# Patient Record
Sex: MALE | Race: BLACK OR AFRICAN AMERICAN | NOT HISPANIC OR LATINO | URBAN - METROPOLITAN AREA
[De-identification: names, ages, dates, MRNs, and addresses within clinical notes are randomized per-mention and may not be internally consistent; named-entity substitution may affect disease eponyms.]

---

## 2018-12-01 ENCOUNTER — EMERGENCY (EMERGENCY)
Facility: HOSPITAL | Age: 63
LOS: 0 days | Discharge: HOME | End: 2018-12-01
Attending: EMERGENCY MEDICINE
Payer: MEDICAID

## 2018-12-01 VITALS
RESPIRATION RATE: 17 BRPM | HEART RATE: 75 BPM | DIASTOLIC BLOOD PRESSURE: 76 MMHG | SYSTOLIC BLOOD PRESSURE: 143 MMHG | OXYGEN SATURATION: 98 %

## 2018-12-01 VITALS
SYSTOLIC BLOOD PRESSURE: 163 MMHG | DIASTOLIC BLOOD PRESSURE: 92 MMHG | RESPIRATION RATE: 16 BRPM | HEIGHT: 68 IN | HEART RATE: 70 BPM | TEMPERATURE: 98 F | WEIGHT: 169.98 LBS

## 2018-12-01 DIAGNOSIS — J45.21 MILD INTERMITTENT ASTHMA WITH (ACUTE) EXACERBATION: ICD-10-CM

## 2018-12-01 DIAGNOSIS — I10 ESSENTIAL (PRIMARY) HYPERTENSION: ICD-10-CM

## 2018-12-01 DIAGNOSIS — E11.9 TYPE 2 DIABETES MELLITUS WITHOUT COMPLICATIONS: ICD-10-CM

## 2018-12-01 DIAGNOSIS — R06.2 WHEEZING: ICD-10-CM

## 2018-12-01 DIAGNOSIS — Z79.899 OTHER LONG TERM (CURRENT) DRUG THERAPY: ICD-10-CM

## 2018-12-01 DIAGNOSIS — Z79.4 LONG TERM (CURRENT) USE OF INSULIN: ICD-10-CM

## 2018-12-01 PROBLEM — Z00.00 ENCOUNTER FOR PREVENTIVE HEALTH EXAMINATION: Status: ACTIVE | Noted: 2018-12-01

## 2018-12-01 PROCEDURE — 99284 EMERGENCY DEPT VISIT MOD MDM: CPT

## 2018-12-01 RX ORDER — ALBUTEROL 90 UG/1
2 AEROSOL, METERED ORAL
Qty: 1 | Refills: 0 | OUTPATIENT
Start: 2018-12-01 | End: 2018-12-07

## 2018-12-01 RX ORDER — IPRATROPIUM/ALBUTEROL SULFATE 18-103MCG
3 AEROSOL WITH ADAPTER (GRAM) INHALATION
Qty: 0 | Refills: 0 | Status: COMPLETED | OUTPATIENT
Start: 2018-12-01 | End: 2018-12-01

## 2018-12-01 RX ADMIN — Medication 20 MILLIGRAM(S): at 16:07

## 2018-12-01 RX ADMIN — Medication 3 MILLILITER(S): at 15:40

## 2018-12-01 RX ADMIN — Medication 3 MILLILITER(S): at 16:00

## 2018-12-01 RX ADMIN — Medication 3 MILLILITER(S): at 16:08

## 2018-12-01 NOTE — ED PROVIDER NOTE - MEDICAL DECISION MAKING DETAILS
Ddx: Asthma exacerbation/ no cough to suggest PNA/ No chest pain to suggest acs  Plan: albuterol, prednisone, reassess

## 2018-12-01 NOTE — ED PROVIDER NOTE - OBJECTIVE STATEMENT
Pt is a 64 yo gentleman with a pmhx of HTN, IDDM2, Asthma who presents to the ED with asthma exacerbation. He was out this morning, and felt some wheezing, and ran out of albuterol. No chest pain, no respiratory distress, no cough, no pleuritic pain.

## 2018-12-20 ENCOUNTER — INPATIENT (INPATIENT)
Facility: HOSPITAL | Age: 63
LOS: 7 days | Discharge: HOME | End: 2018-12-28
Attending: INTERNAL MEDICINE | Admitting: INTERNAL MEDICINE
Payer: MEDICAID

## 2018-12-20 VITALS — HEIGHT: 70 IN | WEIGHT: 184.97 LBS

## 2018-12-20 DIAGNOSIS — J96.90 RESPIRATORY FAILURE, UNSPECIFIED, UNSPECIFIED WHETHER WITH HYPOXIA OR HYPERCAPNIA: ICD-10-CM

## 2018-12-20 DIAGNOSIS — Z95.0 PRESENCE OF CARDIAC PACEMAKER: Chronic | ICD-10-CM

## 2018-12-20 DIAGNOSIS — E11.9 TYPE 2 DIABETES MELLITUS WITHOUT COMPLICATIONS: ICD-10-CM

## 2018-12-20 LAB
ALBUMIN SERPL ELPH-MCNC: 3.4 G/DL — SIGNIFICANT CHANGE UP (ref 3.3–5)
ALP SERPL-CCNC: 52 U/L — SIGNIFICANT CHANGE UP (ref 40–120)
ALT FLD-CCNC: 31 U/L — SIGNIFICANT CHANGE UP (ref 12–78)
AMPHET UR-MCNC: NEGATIVE — SIGNIFICANT CHANGE UP
ANION GAP SERPL CALC-SCNC: 10 MMOL/L — SIGNIFICANT CHANGE UP (ref 5–17)
APAP SERPL-MCNC: <2 UG/ML — LOW (ref 10–30)
APPEARANCE UR: CLEAR — SIGNIFICANT CHANGE UP
APTT BLD: 24.9 SEC — LOW (ref 28.5–37)
AST SERPL-CCNC: 34 U/L — SIGNIFICANT CHANGE UP (ref 15–37)
BACTERIA # UR AUTO: ABNORMAL
BARBITURATES UR SCN-MCNC: NEGATIVE — SIGNIFICANT CHANGE UP
BASE EXCESS BLDA CALC-SCNC: -2.6 MMOL/L — LOW (ref -2–2)
BASE EXCESS BLDA CALC-SCNC: -3.2 MMOL/L — LOW (ref -2–2)
BASOPHILS # BLD AUTO: 0.04 K/UL — SIGNIFICANT CHANGE UP (ref 0–0.2)
BASOPHILS NFR BLD AUTO: 0.4 % — SIGNIFICANT CHANGE UP (ref 0–2)
BENZODIAZ UR-MCNC: POSITIVE — SIGNIFICANT CHANGE UP
BILIRUB SERPL-MCNC: 0.3 MG/DL — SIGNIFICANT CHANGE UP (ref 0.2–1.2)
BILIRUB UR-MCNC: NEGATIVE — SIGNIFICANT CHANGE UP
BLOOD GAS COMMENTS: SIGNIFICANT CHANGE UP
BLOOD GAS SOURCE: SIGNIFICANT CHANGE UP
BLOOD GAS SOURCE: SIGNIFICANT CHANGE UP
BUN SERPL-MCNC: 17 MG/DL — SIGNIFICANT CHANGE UP (ref 7–23)
CALCIUM SERPL-MCNC: 8 MG/DL — LOW (ref 8.5–10.1)
CHLORIDE SERPL-SCNC: 106 MMOL/L — SIGNIFICANT CHANGE UP (ref 96–108)
CK MB BLD-MCNC: 1.7 % — SIGNIFICANT CHANGE UP (ref 0–3.5)
CK MB CFR SERPL CALC: 5.4 NG/ML — HIGH (ref 0.5–3.6)
CK SERPL-CCNC: 314 U/L — HIGH (ref 26–308)
CK SERPL-CCNC: 325 U/L — HIGH (ref 26–308)
CO2 SERPL-SCNC: 25 MMOL/L — SIGNIFICANT CHANGE UP (ref 22–31)
COCAINE METAB.OTHER UR-MCNC: NEGATIVE — SIGNIFICANT CHANGE UP
COLOR SPEC: YELLOW — SIGNIFICANT CHANGE UP
CREAT SERPL-MCNC: 1.11 MG/DL — SIGNIFICANT CHANGE UP (ref 0.5–1.3)
DIFF PNL FLD: ABNORMAL
EOSINOPHIL # BLD AUTO: 0.66 K/UL — HIGH (ref 0–0.5)
EOSINOPHIL NFR BLD AUTO: 6.9 % — HIGH (ref 0–6)
EPI CELLS # UR: NEGATIVE — SIGNIFICANT CHANGE UP
ETHANOL SERPL-MCNC: <10 MG/DL — SIGNIFICANT CHANGE UP (ref 0–10)
GLUCOSE BLDC GLUCOMTR-MCNC: 256 MG/DL — HIGH (ref 70–99)
GLUCOSE SERPL-MCNC: 220 MG/DL — HIGH (ref 70–99)
GLUCOSE UR QL: 1000 MG/DL
HCO3 BLDA-SCNC: 23 MMOL/L — SIGNIFICANT CHANGE UP (ref 21–29)
HCO3 BLDA-SCNC: 24 MMOL/L — SIGNIFICANT CHANGE UP (ref 21–29)
HCT VFR BLD CALC: 33.4 % — LOW (ref 39–50)
HGB BLD-MCNC: 10.2 G/DL — LOW (ref 13–17)
HOROWITZ INDEX BLDA+IHG-RTO: 21 — SIGNIFICANT CHANGE UP
HOROWITZ INDEX BLDA+IHG-RTO: 40 — SIGNIFICANT CHANGE UP
IMM GRANULOCYTES NFR BLD AUTO: 0.3 % — SIGNIFICANT CHANGE UP (ref 0–1.5)
INR BLD: 1.13 RATIO — SIGNIFICANT CHANGE UP (ref 0.88–1.16)
KETONES UR-MCNC: NEGATIVE — SIGNIFICANT CHANGE UP
LACTATE SERPL-SCNC: 0.7 MMOL/L — SIGNIFICANT CHANGE UP (ref 0.7–2)
LACTATE SERPL-SCNC: 2.9 MMOL/L — HIGH (ref 0.7–2)
LEUKOCYTE ESTERASE UR-ACNC: NEGATIVE — SIGNIFICANT CHANGE UP
LYMPHOCYTES # BLD AUTO: 1.63 K/UL — SIGNIFICANT CHANGE UP (ref 1–3.3)
LYMPHOCYTES # BLD AUTO: 16.9 % — SIGNIFICANT CHANGE UP (ref 13–44)
MAGNESIUM SERPL-MCNC: 2.4 MG/DL — SIGNIFICANT CHANGE UP (ref 1.6–2.6)
MCHC RBC-ENTMCNC: 28.3 PG — SIGNIFICANT CHANGE UP (ref 27–34)
MCHC RBC-ENTMCNC: 30.5 GM/DL — LOW (ref 32–36)
MCV RBC AUTO: 92.8 FL — SIGNIFICANT CHANGE UP (ref 80–100)
METHADONE UR-MCNC: NEGATIVE — SIGNIFICANT CHANGE UP
MONOCYTES # BLD AUTO: 0.77 K/UL — SIGNIFICANT CHANGE UP (ref 0–0.9)
MONOCYTES NFR BLD AUTO: 8 % — SIGNIFICANT CHANGE UP (ref 2–14)
NEUTROPHILS # BLD AUTO: 6.5 K/UL — SIGNIFICANT CHANGE UP (ref 1.8–7.4)
NEUTROPHILS NFR BLD AUTO: 67.5 % — SIGNIFICANT CHANGE UP (ref 43–77)
NITRITE UR-MCNC: NEGATIVE — SIGNIFICANT CHANGE UP
NRBC # BLD: 0 /100 WBCS — SIGNIFICANT CHANGE UP (ref 0–0)
OPIATES UR-MCNC: NEGATIVE — SIGNIFICANT CHANGE UP
PCO2 BLDA: 46 MMHG — SIGNIFICANT CHANGE UP (ref 32–46)
PCO2 BLDA: 54 MMHG — HIGH (ref 32–46)
PCP SPEC-MCNC: SIGNIFICANT CHANGE UP
PCP UR-MCNC: NEGATIVE — SIGNIFICANT CHANGE UP
PH BLD: 7.26 — LOW (ref 7.35–7.45)
PH BLD: 7.32 — LOW (ref 7.35–7.45)
PH UR: 5 — SIGNIFICANT CHANGE UP (ref 5–8)
PLATELET # BLD AUTO: 146 K/UL — LOW (ref 150–400)
PO2 BLDA: 134 MMHG — HIGH (ref 74–108)
PO2 BLDA: 388 MMHG — HIGH (ref 74–108)
POTASSIUM SERPL-MCNC: 4.6 MMOL/L — SIGNIFICANT CHANGE UP (ref 3.5–5.3)
POTASSIUM SERPL-SCNC: 4.6 MMOL/L — SIGNIFICANT CHANGE UP (ref 3.5–5.3)
PROT SERPL-MCNC: 7.1 GM/DL — SIGNIFICANT CHANGE UP (ref 6–8.3)
PROT UR-MCNC: 100 MG/DL
PROTHROM AB SERPL-ACNC: 12.7 SEC — SIGNIFICANT CHANGE UP (ref 10–12.9)
RBC # BLD: 3.6 M/UL — LOW (ref 4.2–5.8)
RBC # FLD: 14.6 % — HIGH (ref 10.3–14.5)
RBC CASTS # UR COMP ASSIST: SIGNIFICANT CHANGE UP /HPF (ref 0–4)
SALICYLATES SERPL-MCNC: <1.7 MG/DL — LOW (ref 2.8–20)
SAO2 % BLDA: 100 % — HIGH (ref 92–96)
SAO2 % BLDA: 99 % — HIGH (ref 92–96)
SODIUM SERPL-SCNC: 141 MMOL/L — SIGNIFICANT CHANGE UP (ref 135–145)
SP GR SPEC: 1.02 — SIGNIFICANT CHANGE UP (ref 1.01–1.02)
THC UR QL: NEGATIVE — SIGNIFICANT CHANGE UP
TROPONIN I SERPL-MCNC: 0.11 NG/ML — HIGH (ref 0.01–0.04)
TROPONIN I SERPL-MCNC: 0.27 NG/ML — HIGH (ref 0.01–0.04)
UROBILINOGEN FLD QL: NEGATIVE MG/DL — SIGNIFICANT CHANGE UP
WBC # BLD: 9.63 K/UL — SIGNIFICANT CHANGE UP (ref 3.8–10.5)
WBC # FLD AUTO: 9.63 K/UL — SIGNIFICANT CHANGE UP (ref 3.8–10.5)
WBC UR QL: SIGNIFICANT CHANGE UP

## 2018-12-20 PROCEDURE — 93010 ELECTROCARDIOGRAM REPORT: CPT

## 2018-12-20 PROCEDURE — 71045 X-RAY EXAM CHEST 1 VIEW: CPT | Mod: 26

## 2018-12-20 PROCEDURE — 71275 CT ANGIOGRAPHY CHEST: CPT | Mod: 26

## 2018-12-20 PROCEDURE — 99291 CRITICAL CARE FIRST HOUR: CPT

## 2018-12-20 PROCEDURE — 70450 CT HEAD/BRAIN W/O DYE: CPT | Mod: 26

## 2018-12-20 PROCEDURE — 99292 CRITICAL CARE ADDL 30 MIN: CPT

## 2018-12-20 RX ORDER — DEXTROSE 50 % IN WATER 50 %
25 SYRINGE (ML) INTRAVENOUS ONCE
Qty: 0 | Refills: 0 | Status: DISCONTINUED | OUTPATIENT
Start: 2018-12-20 | End: 2018-12-28

## 2018-12-20 RX ORDER — SODIUM CHLORIDE 9 MG/ML
1000 INJECTION, SOLUTION INTRAVENOUS
Qty: 0 | Refills: 0 | Status: DISCONTINUED | OUTPATIENT
Start: 2018-12-20 | End: 2018-12-28

## 2018-12-20 RX ORDER — DEXTROSE 50 % IN WATER 50 %
12.5 SYRINGE (ML) INTRAVENOUS ONCE
Qty: 0 | Refills: 0 | Status: DISCONTINUED | OUTPATIENT
Start: 2018-12-20 | End: 2018-12-28

## 2018-12-20 RX ORDER — DEXTROSE 50 % IN WATER 50 %
15 SYRINGE (ML) INTRAVENOUS ONCE
Qty: 0 | Refills: 0 | Status: DISCONTINUED | OUTPATIENT
Start: 2018-12-20 | End: 2018-12-28

## 2018-12-20 RX ORDER — PROPOFOL 10 MG/ML
5 INJECTION, EMULSION INTRAVENOUS
Qty: 1000 | Refills: 0 | Status: DISCONTINUED | OUTPATIENT
Start: 2018-12-20 | End: 2018-12-21

## 2018-12-20 RX ORDER — IPRATROPIUM/ALBUTEROL SULFATE 18-103MCG
3 AEROSOL WITH ADAPTER (GRAM) INHALATION ONCE
Qty: 0 | Refills: 0 | Status: COMPLETED | OUTPATIENT
Start: 2018-12-20 | End: 2018-12-20

## 2018-12-20 RX ORDER — GLUCAGON INJECTION, SOLUTION 0.5 MG/.1ML
1 INJECTION, SOLUTION SUBCUTANEOUS ONCE
Qty: 0 | Refills: 0 | Status: DISCONTINUED | OUTPATIENT
Start: 2018-12-20 | End: 2018-12-28

## 2018-12-20 RX ORDER — TIOTROPIUM BROMIDE 18 UG/1
1 CAPSULE ORAL; RESPIRATORY (INHALATION) DAILY
Qty: 0 | Refills: 0 | Status: COMPLETED | OUTPATIENT
Start: 2018-12-20 | End: 2019-11-18

## 2018-12-20 RX ORDER — SODIUM CHLORIDE 9 MG/ML
2000 INJECTION INTRAMUSCULAR; INTRAVENOUS; SUBCUTANEOUS ONCE
Qty: 0 | Refills: 0 | Status: COMPLETED | OUTPATIENT
Start: 2018-12-20 | End: 2018-12-20

## 2018-12-20 RX ORDER — INFLUENZA VIRUS VACCINE 15; 15; 15; 15 UG/.5ML; UG/.5ML; UG/.5ML; UG/.5ML
0.5 SUSPENSION INTRAMUSCULAR ONCE
Qty: 0 | Refills: 0 | Status: DISCONTINUED | OUTPATIENT
Start: 2018-12-20 | End: 2018-12-28

## 2018-12-20 RX ORDER — INSULIN LISPRO 100/ML
VIAL (ML) SUBCUTANEOUS EVERY 6 HOURS
Qty: 0 | Refills: 0 | Status: DISCONTINUED | OUTPATIENT
Start: 2018-12-20 | End: 2018-12-21

## 2018-12-20 RX ORDER — CHLORHEXIDINE GLUCONATE 213 G/1000ML
1 SOLUTION TOPICAL
Qty: 0 | Refills: 0 | Status: DISCONTINUED | OUTPATIENT
Start: 2018-12-20 | End: 2018-12-28

## 2018-12-20 RX ORDER — PANTOPRAZOLE SODIUM 20 MG/1
40 TABLET, DELAYED RELEASE ORAL ONCE
Qty: 0 | Refills: 0 | Status: COMPLETED | OUTPATIENT
Start: 2018-12-20 | End: 2018-12-20

## 2018-12-20 RX ORDER — FUROSEMIDE 40 MG
40 TABLET ORAL ONCE
Qty: 0 | Refills: 0 | Status: COMPLETED | OUTPATIENT
Start: 2018-12-20 | End: 2018-12-20

## 2018-12-20 RX ORDER — CHLORHEXIDINE GLUCONATE 213 G/1000ML
15 SOLUTION TOPICAL
Qty: 0 | Refills: 0 | Status: DISCONTINUED | OUTPATIENT
Start: 2018-12-20 | End: 2018-12-21

## 2018-12-20 RX ORDER — IPRATROPIUM/ALBUTEROL SULFATE 18-103MCG
3 AEROSOL WITH ADAPTER (GRAM) INHALATION EVERY 6 HOURS
Qty: 0 | Refills: 0 | Status: DISCONTINUED | OUTPATIENT
Start: 2018-12-20 | End: 2018-12-22

## 2018-12-20 RX ORDER — ALBUTEROL 90 UG/1
1 AEROSOL, METERED ORAL EVERY 4 HOURS
Qty: 0 | Refills: 0 | Status: COMPLETED | OUTPATIENT
Start: 2018-12-20 | End: 2019-11-18

## 2018-12-20 RX ORDER — ENOXAPARIN SODIUM 100 MG/ML
40 INJECTION SUBCUTANEOUS EVERY 24 HOURS
Qty: 0 | Refills: 0 | Status: DISCONTINUED | OUTPATIENT
Start: 2018-12-20 | End: 2018-12-21

## 2018-12-20 RX ORDER — PANTOPRAZOLE SODIUM 20 MG/1
40 TABLET, DELAYED RELEASE ORAL DAILY
Qty: 0 | Refills: 0 | Status: DISCONTINUED | OUTPATIENT
Start: 2018-12-20 | End: 2018-12-22

## 2018-12-20 RX ADMIN — Medication 6: at 20:45

## 2018-12-20 RX ADMIN — CHLORHEXIDINE GLUCONATE 15 MILLILITER(S): 213 SOLUTION TOPICAL at 20:42

## 2018-12-20 RX ADMIN — PANTOPRAZOLE SODIUM 40 MILLIGRAM(S): 20 TABLET, DELAYED RELEASE ORAL at 20:42

## 2018-12-20 RX ADMIN — Medication 3 MILLILITER(S): at 14:29

## 2018-12-20 RX ADMIN — Medication 40 MILLIGRAM(S): at 18:46

## 2018-12-20 RX ADMIN — PANTOPRAZOLE SODIUM 40 MILLIGRAM(S): 20 TABLET, DELAYED RELEASE ORAL at 15:47

## 2018-12-20 RX ADMIN — SODIUM CHLORIDE 2000 MILLILITER(S): 9 INJECTION INTRAMUSCULAR; INTRAVENOUS; SUBCUTANEOUS at 14:30

## 2018-12-20 RX ADMIN — CHLORHEXIDINE GLUCONATE 1 APPLICATION(S): 213 SOLUTION TOPICAL at 20:43

## 2018-12-20 RX ADMIN — SODIUM CHLORIDE 2000 MILLILITER(S): 9 INJECTION INTRAMUSCULAR; INTRAVENOUS; SUBCUTANEOUS at 15:47

## 2018-12-20 RX ADMIN — Medication 40 MILLIGRAM(S): at 20:43

## 2018-12-20 RX ADMIN — ENOXAPARIN SODIUM 40 MILLIGRAM(S): 100 INJECTION SUBCUTANEOUS at 20:42

## 2018-12-20 RX ADMIN — PROPOFOL 2.52 MICROGRAM(S)/KG/MIN: 10 INJECTION, EMULSION INTRAVENOUS at 14:50

## 2018-12-20 NOTE — ED ADULT NURSE NOTE - NS_BELOGIGINS_SECURE_ED_ALL_FT
money with security money with security. Clothing given to family see belonging sheet. Sneakers with pt

## 2018-12-20 NOTE — ED PROVIDER NOTE - PROGRESS NOTE DETAILS
Dr. Whitfield icu attending is here eval pt sts call her back after ct of head ct angio chest. Pt's wife is here at bed side now sts pt had asthma attack one week ago Pt also has a hx of mi with a pace maker. Dr. Weinberg icu attending is here eval and admit pt to Dr. Whitfield.

## 2018-12-20 NOTE — ED PROVIDER NOTE - ENMT, MLM
Airway patent, Nasal mucosa clear. Mouth with normal mucosa. Throat has no vesicles, no oropharyngeal exudates and uvula is midline. 7.5 et tube thru the vocal cord viualized by glyide scop. at 22 cm

## 2018-12-20 NOTE — ED ADULT NURSE REASSESSMENT NOTE - NS ED NURSE REASSESS COMMENT FT1
Wheezing decreased remains on ventilator tidal volume 450, FIO2 100%, PEEP 5, and rate 14 maintain Propofol at 10/ml hour Dr Platt that it was titrate up to sedate pt

## 2018-12-20 NOTE — H&P ADULT - HISTORY OF PRESENT ILLNESS
63 years old male by ems s/p et intubated c/o respiratory arrest. EMS sts pt was in a restaurant c/o sob and ems noted pt has a neb pump next to him pt was sob, unable to talk. Pt here is et intubated and breath sounds + expiratory wheezing bilaterally. Pt's wife is here at bed side now states pt had asthma attack one week ago was discharge from hospital with steroids  Recent history of PPM/AICD implantation at [facility] as per family. Patient seen and evaluated at bedside with ICU attending. CT head negative for acute infarct or bleed. CTA done  No CT evidence of pulmonary embolism. full report in chart.

## 2018-12-20 NOTE — ED ADULT NURSE REASSESSMENT NOTE - NS ED NURSE REASSESS COMMENT FT1
Pt remains on Profadol infusion current rate is 10 Microgram/KG/min Dr Platt is aware. He remain on vent setting Fio2 100%, PEEP of 5 Tidal volume 450 rate 14

## 2018-12-20 NOTE — ED PROVIDER NOTE - OBJECTIVE STATEMENT
60 years old male by ems s/p et intubated c/o respiratory arrest. EMS sts pt was in a restaurant c/o sob and ems noted pt has a neb pump next to him pt was sob, unable to talk. Pt here is et intubated and breath sounds + expiratory wheezing bilaterally.

## 2018-12-20 NOTE — ED ADULT TRIAGE NOTE - CHIEF COMPLAINT QUOTE
ems states, " pt was found in restaurant in respiratory distress, ams on floor , 74%on ra , 0.3 epi im ,   12mg Dexamethasone iv, 2gm mag sulfate, 24mg Etomidate, 10mg of valium iv in field , intibated with 7.5 ett , no info on patient , no id on him"

## 2018-12-20 NOTE — ED ADULT NURSE NOTE - OBJECTIVE STATEMENT
Pt to the ed via EMS  he is intubated size 7.5 lip line 22 cm Pt has IV access received meds Valium, Dexamethasone, Magnesium IV and Etomidate prior to intubation. Pt was at work and c/o SOB to co workers and collapse. Pt tried to used inhaler. EMS found it next to him. Pt wheezing and mucous to nose and when suctioned PRN on monitor sinus rhythm Dr Platt examined pt upon arrival. Pt with defibrillator to right chest

## 2018-12-20 NOTE — H&P ADULT - NSHPPHYSICALEXAM_GEN_ALL_CORE
GENERAL: NAD, well-groomed, well-developed,  HEAD:  Atraumatic, Normocephalic  EYES: EOMI, PERRLA, conjunctiva and sclera clear  ENMT: ETT in place to right lip,   NECK: Supple, No JVD, Normal thyroid  NERVOUS SYSTEM:  sedated on vent   CHEST/LUNG: Bilat air entry. No rales, ++rhonchi,  +wheezing, or  (-)rubs  HEART: Regular rate and rhythm; No murmurs, rubs, or gallops  ABDOMEN: Soft, Nontender, Nondistended; Bowel sounds present  EXTREMITIES:  2+ Peripheral Pulses, No clubbing, cyanosis, or edema  LYMPH: No lymphadenopathy noted  SKIN: No rashes or lesions

## 2018-12-20 NOTE — H&P ADULT - ASSESSMENT
64 y/o  male PMH Asthma, DM. CAD sp PPM/AICD, CVA,  admitted with respiratory acidosis requiring intubation unclear etiology, possible 2/2 asthma exacerbation.  CTA negative for PE or acute airspace disease.

## 2018-12-20 NOTE — ED PROVIDER NOTE - PMH
Asthma Asthma    CAD (coronary artery disease)    CVA (cerebral vascular accident)    Diabetes    HTN (hypertension)

## 2018-12-20 NOTE — ED PROVIDER NOTE - CRITICAL CARE PROVIDED
additional history taking/consultation with other physicians/interpretation of diagnostic studies/consult w/ pt's family directly relating to pts condition/direct patient care (not related to procedure)/documentation

## 2018-12-20 NOTE — H&P ADULT - PMH
Asthma    CAD (coronary artery disease)    CVA (cerebral vascular accident)    Diabetes    HTN (hypertension)

## 2018-12-20 NOTE — ED ADULT NURSE NOTE - NSIMPLEMENTINTERV_GEN_ALL_ED
Implemented All Fall Risk Interventions:  [place] to call system. Call bell, personal items and telephone within reach. Instruct patient to call for assistance. Room bathroom lighting operational. Non-slip footwear when patient is off stretcher. Physically safe environment: no spills, clutter or unnecessary equipment. Stretcher in lowest position, wheels locked, appropriate side rails in place. Provide visual cue, wrist band, yellow gown, etc. Monitor gait and stability. Monitor for mental status changes and reorient to person, place, and time. Review medications for side effects contributing to fall risk. Reinforce activity limits and safety measures with patient and family.

## 2018-12-20 NOTE — H&P ADULT - NSHPREVIEWOFSYSTEMS_GEN_ALL_CORE
due to clinical status as patient intubated unable to participate   as per family - stated denied recent chest pain + wheezing at home

## 2018-12-20 NOTE — H&P ADULT - PROBLEM SELECTOR PLAN 1
Admit MICU as d/w intensivist,   -mechanical ventilation, abg PRN,   -respiratory treatment, steroids,   -repeat labs and lactate  - will hold on ABX for now as wbc wnl   -follow up culture,   - Peridex, PPI and dvt prophylaxis,   -follow up xray,   -sedation - propofol, or  Precedex, or  fentanyl  or versed gtt,   -weaning daily

## 2018-12-21 LAB
-  COAGULASE NEGATIVE STAPHYLOCOCCUS: SIGNIFICANT CHANGE UP
A BAUMANNII DNA SPEC QL NAA+PROBE: SIGNIFICANT CHANGE UP
ANION GAP SERPL CALC-SCNC: 8 MMOL/L — SIGNIFICANT CHANGE UP (ref 5–17)
BASE EXCESS BLDA CALC-SCNC: 0.4 MMOL/L — SIGNIFICANT CHANGE UP (ref -2–2)
BLOOD GAS COMMENTS: SIGNIFICANT CHANGE UP
BLOOD GAS SOURCE: SIGNIFICANT CHANGE UP
BUN SERPL-MCNC: 17 MG/DL — SIGNIFICANT CHANGE UP (ref 7–23)
CALCIUM SERPL-MCNC: 7.7 MG/DL — LOW (ref 8.5–10.1)
CHLORIDE SERPL-SCNC: 110 MMOL/L — HIGH (ref 96–108)
CK SERPL-CCNC: 253 U/L — SIGNIFICANT CHANGE UP (ref 26–308)
CO2 SERPL-SCNC: 24 MMOL/L — SIGNIFICANT CHANGE UP (ref 22–31)
CREAT SERPL-MCNC: 0.92 MG/DL — SIGNIFICANT CHANGE UP (ref 0.5–1.3)
CULTURE RESULTS: NO GROWTH — SIGNIFICANT CHANGE UP
ENTEROCOC DNA BLD POS QL NAA+NON-PROBE: SIGNIFICANT CHANGE UP
GLUCOSE BLDC GLUCOMTR-MCNC: 200 MG/DL — HIGH (ref 70–99)
GLUCOSE BLDC GLUCOMTR-MCNC: 244 MG/DL — HIGH (ref 70–99)
GLUCOSE BLDC GLUCOMTR-MCNC: 267 MG/DL — HIGH (ref 70–99)
GLUCOSE BLDC GLUCOMTR-MCNC: 284 MG/DL — HIGH (ref 70–99)
GLUCOSE BLDC GLUCOMTR-MCNC: 303 MG/DL — HIGH (ref 70–99)
GLUCOSE SERPL-MCNC: 242 MG/DL — HIGH (ref 70–99)
GRAM STN FLD: SIGNIFICANT CHANGE UP
HBA1C BLD-MCNC: 10.1 % — HIGH (ref 4–5.6)
HCO3 BLDA-SCNC: 23 MMOL/L — SIGNIFICANT CHANGE UP (ref 21–29)
HCT VFR BLD CALC: 33.1 % — LOW (ref 39–50)
HGB BLD-MCNC: 10.2 G/DL — LOW (ref 13–17)
HOROWITZ INDEX BLDA+IHG-RTO: 30 — SIGNIFICANT CHANGE UP
MAGNESIUM SERPL-MCNC: 2.2 MG/DL — SIGNIFICANT CHANGE UP (ref 1.6–2.6)
MCHC RBC-ENTMCNC: 28 PG — SIGNIFICANT CHANGE UP (ref 27–34)
MCHC RBC-ENTMCNC: 30.8 GM/DL — LOW (ref 32–36)
MCV RBC AUTO: 90.9 FL — SIGNIFICANT CHANGE UP (ref 80–100)
METHOD TYPE: SIGNIFICANT CHANGE UP
METHOD TYPE: SIGNIFICANT CHANGE UP
NRBC # BLD: 0 /100 WBCS — SIGNIFICANT CHANGE UP (ref 0–0)
PCO2 BLDA: 31 MMHG — LOW (ref 32–46)
PH BLD: 7.48 — HIGH (ref 7.35–7.45)
PHOSPHATE SERPL-MCNC: 2 MG/DL — LOW (ref 2.5–4.5)
PLATELET # BLD AUTO: 142 K/UL — LOW (ref 150–400)
PO2 BLDA: 127 MMHG — HIGH (ref 74–108)
POTASSIUM SERPL-MCNC: 3.9 MMOL/L — SIGNIFICANT CHANGE UP (ref 3.5–5.3)
POTASSIUM SERPL-SCNC: 3.9 MMOL/L — SIGNIFICANT CHANGE UP (ref 3.5–5.3)
RBC # BLD: 3.64 M/UL — LOW (ref 4.2–5.8)
RBC # FLD: 14.6 % — HIGH (ref 10.3–14.5)
SAO2 % BLDA: 100 % — HIGH (ref 92–96)
SODIUM SERPL-SCNC: 142 MMOL/L — SIGNIFICANT CHANGE UP (ref 135–145)
SPECIMEN SOURCE: SIGNIFICANT CHANGE UP
SPECIMEN SOURCE: SIGNIFICANT CHANGE UP
TROPONIN I SERPL-MCNC: 0.29 NG/ML — HIGH (ref 0.01–0.04)
TROPONIN I SERPL-MCNC: 0.31 NG/ML — HIGH (ref 0.01–0.04)
WBC # BLD: 7.37 K/UL — SIGNIFICANT CHANGE UP (ref 3.8–10.5)
WBC # FLD AUTO: 7.37 K/UL — SIGNIFICANT CHANGE UP (ref 3.8–10.5)

## 2018-12-21 PROCEDURE — 93306 TTE W/DOPPLER COMPLETE: CPT | Mod: 26

## 2018-12-21 PROCEDURE — 99222 1ST HOSP IP/OBS MODERATE 55: CPT

## 2018-12-21 PROCEDURE — 71045 X-RAY EXAM CHEST 1 VIEW: CPT | Mod: 26

## 2018-12-21 PROCEDURE — 99223 1ST HOSP IP/OBS HIGH 75: CPT

## 2018-12-21 RX ORDER — ASPIRIN/CALCIUM CARB/MAGNESIUM 324 MG
81 TABLET ORAL DAILY
Qty: 0 | Refills: 0 | Status: DISCONTINUED | OUTPATIENT
Start: 2018-12-21 | End: 2018-12-28

## 2018-12-21 RX ORDER — PIPERACILLIN AND TAZOBACTAM 4; .5 G/20ML; G/20ML
3.38 INJECTION, POWDER, LYOPHILIZED, FOR SOLUTION INTRAVENOUS EVERY 8 HOURS
Qty: 0 | Refills: 0 | Status: DISCONTINUED | OUTPATIENT
Start: 2018-12-21 | End: 2018-12-22

## 2018-12-21 RX ORDER — POTASSIUM PHOSPHATE, MONOBASIC POTASSIUM PHOSPHATE, DIBASIC 236; 224 MG/ML; MG/ML
15 INJECTION, SOLUTION INTRAVENOUS ONCE
Qty: 0 | Refills: 0 | Status: COMPLETED | OUTPATIENT
Start: 2018-12-21 | End: 2018-12-21

## 2018-12-21 RX ORDER — FOLIC ACID 0.8 MG
1 TABLET ORAL DAILY
Qty: 0 | Refills: 0 | Status: DISCONTINUED | OUTPATIENT
Start: 2018-12-21 | End: 2018-12-28

## 2018-12-21 RX ORDER — INSULIN LISPRO 100/ML
4 VIAL (ML) SUBCUTANEOUS
Qty: 0 | Refills: 0 | Status: DISCONTINUED | OUTPATIENT
Start: 2018-12-21 | End: 2018-12-28

## 2018-12-21 RX ORDER — LISINOPRIL 2.5 MG/1
20 TABLET ORAL DAILY
Qty: 0 | Refills: 0 | Status: DISCONTINUED | OUTPATIENT
Start: 2018-12-21 | End: 2018-12-28

## 2018-12-21 RX ORDER — FUROSEMIDE 40 MG
20 TABLET ORAL DAILY
Qty: 0 | Refills: 0 | Status: DISCONTINUED | OUTPATIENT
Start: 2018-12-21 | End: 2018-12-28

## 2018-12-21 RX ORDER — ATORVASTATIN CALCIUM 80 MG/1
20 TABLET, FILM COATED ORAL AT BEDTIME
Qty: 0 | Refills: 0 | Status: DISCONTINUED | OUTPATIENT
Start: 2018-12-21 | End: 2018-12-28

## 2018-12-21 RX ORDER — PIPERACILLIN AND TAZOBACTAM 4; .5 G/20ML; G/20ML
3.38 INJECTION, POWDER, LYOPHILIZED, FOR SOLUTION INTRAVENOUS EVERY 12 HOURS
Qty: 0 | Refills: 0 | Status: DISCONTINUED | OUTPATIENT
Start: 2018-12-21 | End: 2018-12-21

## 2018-12-21 RX ORDER — ENOXAPARIN SODIUM 100 MG/ML
80 INJECTION SUBCUTANEOUS EVERY 12 HOURS
Qty: 0 | Refills: 0 | Status: DISCONTINUED | OUTPATIENT
Start: 2018-12-21 | End: 2018-12-28

## 2018-12-21 RX ORDER — CARVEDILOL PHOSPHATE 80 MG/1
12.5 CAPSULE, EXTENDED RELEASE ORAL EVERY 12 HOURS
Qty: 0 | Refills: 0 | Status: DISCONTINUED | OUTPATIENT
Start: 2018-12-21 | End: 2018-12-28

## 2018-12-21 RX ORDER — THIAMINE MONONITRATE (VIT B1) 100 MG
100 TABLET ORAL DAILY
Qty: 0 | Refills: 0 | Status: DISCONTINUED | OUTPATIENT
Start: 2018-12-21 | End: 2018-12-28

## 2018-12-21 RX ORDER — INSULIN GLARGINE 100 [IU]/ML
15 INJECTION, SOLUTION SUBCUTANEOUS AT BEDTIME
Qty: 0 | Refills: 0 | Status: DISCONTINUED | OUTPATIENT
Start: 2018-12-21 | End: 2018-12-28

## 2018-12-21 RX ORDER — INSULIN LISPRO 100/ML
VIAL (ML) SUBCUTANEOUS
Qty: 0 | Refills: 0 | Status: DISCONTINUED | OUTPATIENT
Start: 2018-12-21 | End: 2018-12-28

## 2018-12-21 RX ADMIN — CHLORHEXIDINE GLUCONATE 15 MILLILITER(S): 213 SOLUTION TOPICAL at 05:29

## 2018-12-21 RX ADMIN — Medication 6: at 16:30

## 2018-12-21 RX ADMIN — Medication 4 UNIT(S): at 16:30

## 2018-12-21 RX ADMIN — PIPERACILLIN AND TAZOBACTAM 25 GRAM(S): 4; .5 INJECTION, POWDER, LYOPHILIZED, FOR SOLUTION INTRAVENOUS at 21:40

## 2018-12-21 RX ADMIN — ATORVASTATIN CALCIUM 20 MILLIGRAM(S): 80 TABLET, FILM COATED ORAL at 21:38

## 2018-12-21 RX ADMIN — Medication 1 MILLIGRAM(S): at 15:28

## 2018-12-21 RX ADMIN — PANTOPRAZOLE SODIUM 40 MILLIGRAM(S): 20 TABLET, DELAYED RELEASE ORAL at 11:42

## 2018-12-21 RX ADMIN — Medication 6: at 00:50

## 2018-12-21 RX ADMIN — Medication 4 UNIT(S): at 11:41

## 2018-12-21 RX ADMIN — Medication 20 MILLIGRAM(S): at 15:28

## 2018-12-21 RX ADMIN — Medication 4: at 05:30

## 2018-12-21 RX ADMIN — LISINOPRIL 20 MILLIGRAM(S): 2.5 TABLET ORAL at 21:42

## 2018-12-21 RX ADMIN — Medication 3 MILLILITER(S): at 17:06

## 2018-12-21 RX ADMIN — Medication 40 MILLIGRAM(S): at 05:29

## 2018-12-21 RX ADMIN — POTASSIUM PHOSPHATE, MONOBASIC POTASSIUM PHOSPHATE, DIBASIC 63.75 MILLIMOLE(S): 236; 224 INJECTION, SOLUTION INTRAVENOUS at 09:11

## 2018-12-21 RX ADMIN — Medication 100 MILLIGRAM(S): at 15:28

## 2018-12-21 RX ADMIN — CARVEDILOL PHOSPHATE 12.5 MILLIGRAM(S): 80 CAPSULE, EXTENDED RELEASE ORAL at 17:22

## 2018-12-21 RX ADMIN — Medication 2: at 11:41

## 2018-12-21 RX ADMIN — Medication 3 MILLILITER(S): at 11:28

## 2018-12-21 RX ADMIN — ENOXAPARIN SODIUM 80 MILLIGRAM(S): 100 INJECTION SUBCUTANEOUS at 17:22

## 2018-12-21 RX ADMIN — Medication 3 MILLILITER(S): at 00:56

## 2018-12-21 RX ADMIN — Medication 3 MILLILITER(S): at 05:42

## 2018-12-21 RX ADMIN — Medication 8: at 21:40

## 2018-12-21 RX ADMIN — Medication 40 MILLIGRAM(S): at 17:22

## 2018-12-21 RX ADMIN — Medication 81 MILLIGRAM(S): at 15:28

## 2018-12-21 RX ADMIN — CHLORHEXIDINE GLUCONATE 1 APPLICATION(S): 213 SOLUTION TOPICAL at 05:29

## 2018-12-21 RX ADMIN — PROPOFOL 2.52 MICROGRAM(S)/KG/MIN: 10 INJECTION, EMULSION INTRAVENOUS at 00:41

## 2018-12-21 RX ADMIN — INSULIN GLARGINE 15 UNIT(S): 100 INJECTION, SOLUTION SUBCUTANEOUS at 21:39

## 2018-12-21 RX ADMIN — PIPERACILLIN AND TAZOBACTAM 25 GRAM(S): 4; .5 INJECTION, POWDER, LYOPHILIZED, FOR SOLUTION INTRAVENOUS at 13:32

## 2018-12-21 NOTE — PROGRESS NOTE ADULT - ASSESSMENT
64 y/o  male PMH Asthma? DM. CAD sp PPM/AICD, CVA,  admitted with respiratory distress requiring intubation in the field by EMS, unclear etiology, possible 2/2 asthma exacerbation.  CTA negative for PE or acute airspace disease. CT head with old infarcts.    -S/P extubation this am , doing well  -cont on due nebs, taper steroids  -Pulm fup on floors and outpt.  -BC sent from ER with GPCS and GNR. contamination?   -repeat blood c/s, empiric abx, no evidence of infection  -insulin sliding scale and fingersticks,   -resume home meds  -check A1C, diabetic teaching, ADA diet.       CC time: 40 min

## 2018-12-21 NOTE — PROVIDER CONTACT NOTE (CRITICAL VALUE NOTIFICATION) - TEST AND RESULT REPORTED:
Blood culture from 12/20@1520 Growth in aerobic bottle Gram positive cocci in pairs and chains and gram negative Rodes

## 2018-12-21 NOTE — DIETITIAN INITIAL EVALUATION ADULT. - NS AS NUTRI INTERV ED CONTENT
Recommended modifications/Purpose of the nutrition education/Priority modifications/Survival information/Provided diabetic diet instruction & meal planning with the plate method & outpatient diabetes wellness information & Vit K & medications handout material

## 2018-12-21 NOTE — PROVIDER CONTACT NOTE (CRITICAL VALUE NOTIFICATION) - TEST AND RESULT REPORTED:
blood c/s send 12/20/18 growth in aerobottle gram positive cocci in pairs and chains and gram negative rods

## 2018-12-21 NOTE — DIETITIAN INITIAL EVALUATION ADULT. - ADHERENCE
poor/Noncompliant diet; excessive intake of CHO & sugar; Breakfast; Egg & cheese & WW bread & coffee;   Lunch & Dinner; G'Sharmila, rice, Dumplings Sardines, jackson chicken Pt states he avoid dark green leafy vegetables due to Coumadin

## 2018-12-21 NOTE — DIETITIAN INITIAL EVALUATION ADULT. - PERTINENT MEDS FT
ALBUTerol    90 MICROgram(s) HFA Inhaler  ALBUTerol/ipratropium for Nebulization  chlorhexidine 4% Liquid  dextrose 40% Gel PRN  dextrose 5%.  dextrose 50% Injectable  dextrose 50% Injectable  dextrose 50% Injectable  enoxaparin Injectable  glucagon  Injectable PRN  influenza   Vaccine  insulin glargine Injectable (LANTUS) 15units HS  insulin lispro (HumaLOG) corrective regimen sliding scale  insulin lispro Injectable (HumaLOG)  insulin lispro Injectable (HumaLOG)  insulin lispro Injectable (HumaLOG)  methylPREDNISolone sodium succinate Injectable  pantoprazole  Injectable  piperacillin/tazobactam IVPB.  propofol Infusion.  tiotropium 18 MICROgram(s) Capsule

## 2018-12-21 NOTE — DIETITIAN INITIAL EVALUATION ADULT. - SOURCE
patient/family/significant other/other (specify)/Spoke to pt & daughter & ex- wife; Medical chart review

## 2018-12-21 NOTE — CONSULT NOTE ADULT - SUBJECTIVE AND OBJECTIVE BOX
CHIEF COMPLAINT:  Patient is a 63y old  Male who presents with a chief complaint of respiratory failure (21 Dec 2018 11:04)      HPI:  63 years old male by ems s/p et intubated c/o respiratory arrest. pt was in a restaurant c/o sob and ems noted pt has a neb pump next to him pt was sob, unable to talk. Pt here is et intubated and breath sounds + expiratory wheezing bilaterally. He had asthma attack one week ago was discharge from hospital with steroids  Recent history of PPM/AICD implantation at [facility] as per family. Patient seen and evaluated at bedside with ICU attending. CT head negative for acute infarct or bleed. CTA done  No CT evidence of pulmonary embolism.    ALLERGIES:  No Known Allergies    MEDICATIONS  (STANDING):  ALBUTerol    90 MICROgram(s) HFA Inhaler 1 Puff(s) Inhalation every 4 hours  ALBUTerol/ipratropium for Nebulization 3 milliLiter(s) Nebulizer every 6 hours  aspirin enteric coated 81 milliGRAM(s) Oral daily  atorvastatin 20 milliGRAM(s) Oral at bedtime  carvedilol 12.5 milliGRAM(s) Oral every 12 hours  chlorhexidine 4% Liquid 1 Application(s) Topical <User Schedule>  enoxaparin Injectable 80 milliGRAM(s) SubCutaneous every 12 hours  folic acid 1 milliGRAM(s) Oral daily  furosemide    Tablet 20 milliGRAM(s) Oral daily  influenza   Vaccine 0.5 milliLiter(s) IntraMuscular once  insulin glargine Injectable (LANTUS) 15 Unit(s) SubCutaneous at bedtime  insulin lispro (HumaLOG) corrective regimen sliding scale   SubCutaneous Before meals and at bedtime  insulin lispro Injectable (HumaLOG) 4 Unit(s) SubCutaneous before breakfast  insulin lispro Injectable (HumaLOG) 4 Unit(s) SubCutaneous before lunch  insulin lispro Injectable (HumaLOG) 4 Unit(s) SubCutaneous before dinner  lisinopril 20 milliGRAM(s) Oral daily  pantoprazole  Injectable 40 milliGRAM(s) IV Push daily  piperacillin/tazobactam IVPB. 3.375 Gram(s) IV Intermittent every 8 hours  thiamine 100 milliGRAM(s) Oral daily  tiotropium 18 MICROgram(s) Capsule 1 Capsule(s) Inhalation daily      PAST MEDICAL & SURGICAL HISTORY:  Diabetes  HTN (hypertension)  CAD (coronary artery disease)  CVA (cerebral vascular accident)  Asthma  Cardiac pacemaker      FAMILY HISTORY:  No pertinent family history in first degree relatives      SOCIAL HISTORY:  Denies tobacco use    REVIEW OF SYSTEMS:  General:  No wt loss, fevers, chills, night sweats  Eyes:  Good vision, no reported pain  ENT:  No sore throat, pain, runny nose, dysphagia  CV:  No pain, palpitations, hypo/hypertension  Resp:  No dyspnea, cough, tachypnea, wheezing  GI:  No pain, nausea, vomiting, diarrhea, constipation  :  No pain, bleeding, incontinence, nocturia  Muscle:  No pain, weakness  Breast:  No pain, abscess, mass, discharge  Neuro:  No weakness, tingling, memory problems  Psych:  No fatigue, insomnia, mood problems, depression  Endocrine:  No polyuria, polydipsia, cold/heat intolerance  Heme:  No petechiae, ecchymosis, easy bruisability  Skin:  No rash, edema    PHYSICAL EXAM:  Vital Signs:  Vital Signs Last 24 Hrs  T(C): 37.1 (21 Dec 2018 15:58), Max: 37.2 (21 Dec 2018 12:00)  T(F): 98.8 (21 Dec 2018 15:58), Max: 98.9 (21 Dec 2018 12:00)  HR: 86 (21 Dec 2018 17:24) (61 - 92)  BP: 131/62 (21 Dec 2018 16:00) (89/57 - 161/88)  BP(mean): 78 (21 Dec 2018 16:00) (65 - 106)  RR: 20 (21 Dec 2018 17:00) (14 - 24)  SpO2: 98% (21 Dec 2018 17:24) (97% - 100%)  I&O's Summary    20 Dec 2018 07:  -  21 Dec 2018 07:00  --------------------------------------------------------  IN: 4185.6 mL / OUT: 3300 mL / NET: 885.6 mL    21 Dec 2018 07:  -  21 Dec 2018 18:09  --------------------------------------------------------  IN: 750 mL / OUT: 460 mL / NET: 290 mL    Tele: SR  General:  Appears stated age, well-groomed, well-nourished, no distress  HEENT:  NC/AT, patent nares w/ pink mucosa, OP clear w/o lesions, PERRL, EOMI, conjunctivae clear, no thyromegaly, nodules, adenopathy, no JVD  Chest:  Full & symmetric excursion, no increased effort, breath sounds clear  Cardiovascular:  Regular rhythm, S1, S2, no murmur  Abdomen:  Soft, non-tender, non-distended, normoactive bowel sounds  Extremities:  no edema  Skin:  skin is warm/dry  Musculoskeletal:  Full ROM in all joints w/o swelling/tenderness/effusion    LABORATORY:                          10.2   7.37  )-----------( 142      ( 21 Dec 2018 04:02 )             33.1     12-    142  |  110<H>  |  17  ----------------------------<  242<H>  3.9   |  24  |  0.92    Ca    7.7<L>      21 Dec 2018 04:02  Phos  2.0     12  Mg     2.2         TPro  7.1  /  Alb  3.4  /  TBili  0.3  /  DBili  x   /  AST  34  /  ALT  31  /  AlkPhos  52  12-20    ABG - ( 21 Dec 2018 08:20 )  pH, Arterial: x     pH, Blood: 7.48  /  pCO2: 31    /  pO2: 127   / HCO3: 23    / Base Excess: 0.4   /  SaO2: 100         CARDIAC MARKERS ( 21 Dec 2018 12:07 )  .294 ng/mL / x     / x     / x     / x      CARDIAC MARKERS ( 21 Dec 2018 04:02 )  .309 ng/mL / x     / 253 U/L / x     / x      CARDIAC MARKERS ( 20 Dec 2018 19:45 )  .272 ng/mL / x     / 314 U/L / x     / x      CARDIAC MARKERS ( 20 Dec 2018 15:14 )  .112 ng/mL / x     / 325 U/L / x     / 5.4 ng/mL      CAPILLARY BLOOD GLUCOSE  POCT Blood Glucose.: 284 mg/dL (21 Dec 2018 16:26)  POCT Blood Glucose.: 200 mg/dL (21 Dec 2018 11:36)  POCT Blood Glucose.: 244 mg/dL (21 Dec 2018 05:28)  POCT Blood Glucose.: 267 mg/dL (21 Dec 2018 00:48)  POCT Blood Glucose.: 256 mg/dL (20 Dec 2018 20:39)    LIVER FUNCTIONS - ( 20 Dec 2018 15:14 )  Alb: 3.4 g/dL / Pro: 7.1 gm/dL / ALK PHOS: 52 U/L / ALT: 31 U/L / AST: 34 U/L / GGT: x           PT/INR - ( 20 Dec 2018 15:14 )   PT: 12.7 sec;   INR: 1.13 ratio         PTT - ( 20 Dec 2018 15:14 )  PTT:24.9 sec  Urinalysis Basic - ( 20 Dec 2018 15:59 )    Color: Yellow / Appearance: Clear / S.020 / pH: x  Gluc: x / Ketone: Negative  / Bili: Negative / Urobili: Negative mg/dL   Blood: x / Protein: 100 mg/dL / Nitrite: Negative   Leuk Esterase: Negative / RBC: 0-2 /HPF / WBC 0-2   Sq Epi: x / Non Sq Epi: Negative / Bacteria: Few    IMAGING:  ECG: SR, LVH    < from: Xray Chest 1 View AP/PA. (18 @ 07:36) >  No acute airspace disease or consolidation suggested. Support devices in   situ.    < end of copied text >    ASSESSMENT:  63 years old male by ems s/p et intubated c/o respiratory arrest. pt was in a restaurant c/o sob and ems noted pt has a neb pump next to him pt was sob, unable to talk. Pt here is et intubated and breath sounds + expiratory wheezing bilaterally. He had asthma attack one week ago was discharge from hospital with steroids  Recent history of PPM/AICD implantation at [facility] as per family. Patient seen and evaluated at bedside with ICU attending. CT head negative for acute infarct or bleed. CTA done  No CT evidence of pulmonary embolism.    PLAN:     CCU care management.  Follow up labs. Telemetry monitoring.    MEDICATIONS  (STANDING):  ALBUTerol    90 MICROgram(s) HFA Inhaler 1 Puff(s) Inhalation every 4 hours  ALBUTerol/ipratropium for Nebulization 3 milliLiter(s) Nebulizer every 6 hours  aspirin enteric coated 81 milliGRAM(s) Oral daily  atorvastatin 20 milliGRAM(s) Oral at bedtime  carvedilol 12.5 milliGRAM(s) Oral every 12 hours  chlorhexidine 4% Liquid 1 Application(s) Topical <User Schedule>  enoxaparin Injectable 80 milliGRAM(s) SubCutaneous every 12 hours  folic acid 1 milliGRAM(s) Oral daily  furosemide    Tablet 20 milliGRAM(s) Oral daily  influenza   Vaccine 0.5 milliLiter(s) IntraMuscular once  insulin glargine Injectable (LANTUS) 15 Unit(s) SubCutaneous at bedtime  insulin lispro (HumaLOG) corrective regimen sliding scale   SubCutaneous Before meals and at bedtime  insulin lispro Injectable (HumaLOG) 4 Unit(s) SubCutaneous before breakfast  insulin lispro Injectable (HumaLOG) 4 Unit(s) SubCutaneous before lunch  insulin lispro Injectable (HumaLOG) 4 Unit(s) SubCutaneous before dinner  lisinopril 20 milliGRAM(s) Oral daily  pantoprazole  Injectable 40 milliGRAM(s) IV Push daily  piperacillin/tazobactam IVPB. 3.375 Gram(s) IV Intermittent every 8 hours  thiamine 100 milliGRAM(s) Oral daily  tiotropium 18 MICROgram(s) Capsule 1 Capsule(s) Inhalation daily    echo pending.    Gutierrez Stovall MD, FACC, FASRAY, FASNC, FACP  Director, Heart Failure Services  [facility]  , Department of Cardiology  [facility]

## 2018-12-21 NOTE — DIETITIAN INITIAL EVALUATION ADULT. - PERTINENT LABORATORY DATA
12-21 Na 142   Glu 242   K+ 3.9   Cr 0.29   BUN 17   Phos 2.0   Alb 3.6   PAB n/a   Hgb 10.2   Hct 33.1   HgA1C 10.1 %<H>  Glucose, Serum: 242 mg/dL <H>, UOEY=336, 244, 267

## 2018-12-21 NOTE — PROGRESS NOTE ADULT - SUBJECTIVE AND OBJECTIVE BOX
HPI:  63 years old male by ems s/p et intubated c/o respiratory arrest. EMS sts pt was in a restaurant c/o sob and ems noted pt has a neb pump next to him pt was sob, unable to talk. Pt here is et intubated and breath sounds + expiratory wheezing bilaterally. Pt's wife is here at bed side now states pt had asthma attack one week ago was discharge from hospital with steroids  Recent history of PPM/AICD implantation at [facility] as per family. Patient seen and evaluated at bedside with ICU attending. CT head negative for acute infarct or bleed. CTA done  No CT evidence of pulmonary embolism. full report in chart. (20 Dec 2018 18:25)    Over 24 Hrs: s/p extubation this am, doing well.    PAST MEDICAL & SURGICAL HISTORY:  Diabetes  HTN (hypertension)  CAD (coronary artery disease)  CVA (cerebral vascular accident)  Asthma  Cardiac pacemaker      ## ROS:   CONSTITUTIONAL: No fever, chills  EYES: No eye pain, visual disturbances  ENMT:  No difficulty hearing, No sinus or throat pain  RESPIRATORY: No cough, wheezing, hemoptysis; No shortness of breath  CARDIOVASCULAR: No chest pain, palpitations  GASTROINTESTINAL: No abdominal or epigastric pain. No nausea, vomiting, or hematemesis; No diarrhea. No melena or hematochezia.  GENITOURINARY: No dysuria, frequency, hematuria  NEUROLOGICAL: No headaches  ENDOCRINE: No heat or cold intolerance  MUSCULOSKELETAL: No joint pain or swelling; No muscle, back, or extremity pain    ## O/E:  Vitals: T(C): 36.6 (12-21-18 @ 07:30), Max: 36.8 (12-21-18 @ 04:03)  HR: 74 (12-21-18 @ 10:00) (61 - 90)  BP: 147/72 (12-21-18 @ 10:00) (89/57 - 163/84)  BP(mean): 88 (12-21-18 @ 10:00) (65 - 106)  RR: 20 (12-21-18 @ 10:00) (14 - 26)  SpO2: 98% (12-21-18 @ 10:00) (98% - 100%)  Wt(kg): --  Gen: lying comfortably in bed in no apparent distress  HEENT: PERRL, EOMI  Resp: CTA B/L no c/r/w  CVS: S1S2 no m/r/g  Abd: soft NT/ND +BS  Ext: no c/c/e  Neuro: A&Ox3      12-20 @ 07:01  -  12-21 @ 07:00  --------------------------------------------------------  IN: 4185.6 mL / OUT: 3300 mL / NET: 885.6 mL    12-21 @ 07:01  - 12-21 @ 11:04  --------------------------------------------------------  IN: 0 mL / OUT: 160 mL / NET: -160 mL      Mode: CPAP with PS, FiO2: 30, PEEP: 5, PS: 5, ITime: 0.9, MAP: 7, PIP: 10    ## Labs:                        10.2   7.37  )-----------( 142      ( 21 Dec 2018 04:02 )             33.1     12-21    142  |  110<H>  |  17  ----------------------------<  242<H>  3.9   |  24  |  0.92    Ca    7.7<L>      21 Dec 2018 04:02  Phos  2.0     12-21  Mg     2.2     12-21    TPro  7.1  /  Alb  3.4  /  TBili  0.3  /  DBili  x   /  AST  34  /  ALT  31  /  AlkPhos  52  12-20    PT/INR - ( 20 Dec 2018 15:14 )   PT: 12.7 sec;   INR: 1.13 ratio         PTT - ( 20 Dec 2018 15:14 )  PTT:24.9 sec  ABG - ( 21 Dec 2018 08:20 )  pH, Arterial: x     pH, Blood: 7.48  /  pCO2: 31    /  pO2: 127   / HCO3: 23    / Base Excess: 0.4   /  SaO2: 100           CARDIAC MARKERS ( 21 Dec 2018 04:02 )  .309 ng/mL / x     / 253 U/L / x     / x      CARDIAC MARKERS ( 20 Dec 2018 19:45 )  .272 ng/mL / x     / 314 U/L / x     / x      CARDIAC MARKERS ( 20 Dec 2018 15:14 )  .112 ng/mL / x     / 325 U/L / x     / 5.4 ng/mL        ## Imaging: reviewed    MEDICATIONS  (STANDING):  ALBUTerol    90 MICROgram(s) HFA Inhaler 1 Puff(s) Inhalation every 4 hours  ALBUTerol/ipratropium for Nebulization 3 milliLiter(s) Nebulizer every 6 hours  chlorhexidine 4% Liquid 1 Application(s) Topical <User Schedule>  dextrose 5%. 1000 milliLiter(s) (50 mL/Hr) IV Continuous <Continuous>  dextrose 50% Injectable 12.5 Gram(s) IV Push once  dextrose 50% Injectable 25 Gram(s) IV Push once  dextrose 50% Injectable 25 Gram(s) IV Push once  enoxaparin Injectable 40 milliGRAM(s) SubCutaneous every 24 hours  influenza   Vaccine 0.5 milliLiter(s) IntraMuscular once  insulin lispro (HumaLOG) corrective regimen sliding scale   SubCutaneous every 6 hours  methylPREDNISolone sodium succinate Injectable 40 milliGRAM(s) IV Push every 8 hours  pantoprazole  Injectable 40 milliGRAM(s) IV Push daily  piperacillin/tazobactam IVPB. 3.375 Gram(s) IV Intermittent every 8 hours  propofol Infusion. 5 MICROgram(s)/kG/Min (2.517 mL/Hr) IV Continuous <Continuous>  tiotropium 18 MICROgram(s) Capsule 1 Capsule(s) Inhalation daily    MEDICATIONS  (PRN):  dextrose 40% Gel 15 Gram(s) Oral once PRN Blood Glucose LESS THAN 70 milliGRAM(s)/deciliter  glucagon  Injectable 1 milliGRAM(s) IntraMuscular once PRN Glucose LESS THAN 70 milligrams/deciliter        ## Code status:  Goals of care discussion: [x] yes [ ] no  [x] full code  [ ] DNR  [ ] DNI  [ ] CATHY

## 2018-12-21 NOTE — CONSULT NOTE ADULT - SUBJECTIVE AND OBJECTIVE BOX
Patient is a 63y old  Male who presents with a chief complaint of respiratory failure (21 Dec 2018 18:09)    HPI:  63 years old male by ems s/p et intubated c/o respiratory arrest. EMS sts pt was in a restaurant c/o sob and ems noted pt has a neb pump next to him pt was sob, unable to talk. Pt here is et intubated and breath sounds + expiratory wheezing bilaterally. Pt's wife is here at bed side now states pt had asthma attack one week ago was discharge from hospital with steroids  Recent history of PPM/AICD implantation at [facility] as per family. Patient seen and evaluated at bedside with ICU attending. CT head negative for acute infarct or bleed. CTA done  No CT evidence of pulmonary embolism. full report in chart. (2  Admitted to ICU with Respiratory failure .  18: extubated.  18:  Says he was having cough , runny nose and scratchy throat few days prior to his current attack.  Smoked 35-40 years, quit 6 years ago, has Asthma for 20 years.    PAST MEDICAL & SURGICAL HISTORY:  Diabetes  HTN (hypertension)  CAD (coronary artery disease)  CVA (cerebral vascular accident)  Asthma  Cardiac pacemaker    FAMILY HISTORY:  No pertinent family history in first degree relatives    SOCIAL HISTORY: smoked 35-40 years, quit 6 years ago.    Allergies  No Known Allergies    MEDICATIONS  (STANDING):  ALBUTerol    90 MICROgram(s) HFA Inhaler 1 Puff(s) Inhalation every 4 hours  ALBUTerol/ipratropium for Nebulization 3 milliLiter(s) Nebulizer every 6 hours  aspirin enteric coated 81 milliGRAM(s) Oral daily  atorvastatin 20 milliGRAM(s) Oral at bedtime  carvedilol 12.5 milliGRAM(s) Oral every 12 hours  chlorhexidine 4% Liquid 1 Application(s) Topical <User Schedule>  dextrose 5%. 1000 milliLiter(s) (50 mL/Hr) IV Continuous <Continuous>  dextrose 50% Injectable 12.5 Gram(s) IV Push once  dextrose 50% Injectable 25 Gram(s) IV Push once  dextrose 50% Injectable 25 Gram(s) IV Push once  enoxaparin Injectable 80 milliGRAM(s) SubCutaneous every 12 hours  folic acid 1 milliGRAM(s) Oral daily  furosemide    Tablet 20 milliGRAM(s) Oral daily  influenza   Vaccine 0.5 milliLiter(s) IntraMuscular once  insulin glargine Injectable (LANTUS) 15 Unit(s) SubCutaneous at bedtime  insulin lispro (HumaLOG) corrective regimen sliding scale   SubCutaneous Before meals and at bedtime  insulin lispro Injectable (HumaLOG) 4 Unit(s) SubCutaneous before breakfast  insulin lispro Injectable (HumaLOG) 4 Unit(s) SubCutaneous before lunch  insulin lispro Injectable (HumaLOG) 4 Unit(s) SubCutaneous before dinner  lisinopril 20 milliGRAM(s) Oral daily  pantoprazole  Injectable 40 milliGRAM(s) IV Push daily  piperacillin/tazobactam IVPB. 3.375 Gram(s) IV Intermittent every 8 hours  thiamine 100 milliGRAM(s) Oral daily  tiotropium 18 MICROgram(s) Capsule 1 Capsule(s) Inhalation daily    MEDICATIONS  (PRN):  dextrose 40% Gel 15 Gram(s) Oral once PRN Blood Glucose LESS THAN 70 milliGRAM(s)/deciliter  glucagon  Injectable 1 milliGRAM(s) IntraMuscular once PRN Glucose LESS THAN 70 milligrams/deciliter    Vital Signs Last 24 Hrs  T(C): 37.1 (21 Dec 2018 19:01), Max: 37.2 (21 Dec 2018 12:00)  T(F): 98.8 (21 Dec 2018 19:01), Max: 98.9 (21 Dec 2018 12:00)  HR: 65 (21 Dec 2018 22:00) (65 - 92)  BP: 131/65 (21 Dec 2018 21:00) (89/57 - 147/72)  BP(mean): 81 (21 Dec 2018 21:00) (65 - 97)  RR: 13 (21 Dec 2018 22:00) (13 - 24)  SpO2: 96% (21 Dec 2018 22:00) (96% - 100%)    PHYSICAL EXAM:  GEN:         Awake, responsive and comfortable.  HEENT:     Normal.    RESP:        decreased air entry.  CVS:          Regular rate and rhythm.   ABD:         Soft, non-tender, non-distended;   :             No costovertebral angle tenderness  SKIN:           Warm and dry.  EXTR:            No clubbing, cyanosis or edema  CNS:              Intact sensory and motor function.  PSYCH:        cooperative, no anxiety or depression    LABS:                        10.2   7.37  )-----------( 142      ( 21 Dec 2018 04:02 )             33.1     12-    142  |  110<H>  |  17  ----------------------------<  242<H>  3.9   |  24  |  0.92    Ca    7.7<L>      21 Dec 2018 04:02  Phos  2.0       Mg     2.2         TPro  7.1  /  Alb  3.4  /  TBili  0.3  /  DBili  x   /  AST  34  /  ALT  31  /  AlkPhos  52      PT/INR - ( 20 Dec 2018 15:14 )   PT: 12.7 sec;   INR: 1.13 ratio      PTT - ( 20 Dec 2018 15:14 )  PTT:24.9 sec   @ 08:20  pH: 7.48  pCO2: 31  pO2: 127  SaO2: 100   @ 21:37  pH: 7.32  pCO2: 46  pO2: 134  SaO2: 99   @ 14:45  pH: 7.26  pCO2: 54  pO2: 388  SaO2: 100    Urinalysis Basic - ( 20 Dec 2018 15:59 )    Color: Yellow / Appearance: Clear / S.020 / pH: x  Gluc: x / Ketone: Negative  / Bili: Negative / Urobili: Negative mg/dL   Blood: x / Protein: 100 mg/dL / Nitrite: Negative   Leuk Esterase: Negative / RBC: 0-2 /HPF / WBC 0-2   Sq Epi: x / Non Sq Epi: Negative / Bacteria: Few    Culture - Blood (collected 18 @ 19:02)  Source: .Blood Blood-Peripheral  Gram Stain (prelim) (18 @ 22:42):    Growth in aerobic bottle: Gram Positive Cocci in Clusters  Preliminary Report (18 @ 22:43):    Growth in aerobic bottle: Gram Positive Cocci in Clusters    "Due to technical problems, Proteus sp. will Not be reported as part of    the BCID panel until further notice"    ***Blood Panel PCR results on this specimen are available    approximately 3 hours after the Gram stain result.***    Gram stain, PCR, and/or culture results may not always    correspond due to difference in methodologies.    ************************************************************    This PCR assay was performed using [employer].    The following targets are tested for: Enterococcus,    vancomycin resistant enterococci, Listeria monocytogenes,    coagulase negative staphylococci, S. aureus,    methicillin resistant S. aureus, Streptococcus agalactiae    (Group B), S. pneumoniae, S.pyogenes (Group A),    Acinetobacter baumannii, Enterobacter cloacae, E. coli,    Klebsiella oxytoca, K. pneumoniae, Proteus sp.,    Serratia marcescens, Haemophilus influenzae,    Neisseria meningitidis, Pseudomonas aeruginosa, Candida    albicans, C. glabrata, C krusei, C parapsilosis,    C. tropicalis and the KPC resistance gene.    Culture - Blood (collected 18 @ 19:02)  Source: .Blood Blood-Peripheral  Gram Stain (prelim) (18 @ 16:54):    Growth in aerobic bottle: Gram Positive Cocci in Pairs and Chains and    Gram Negative Rods    Growth in anaerobic bottle: Gram positive cocci in pairs  Preliminary Report (18 @ 16:55):    Growth in aerobic bottle: Gram Positive Cocci in Pairs and Chains and    Gram Negative Rods    "Due to technical problems, Proteus sp. will Not be reported as part of    the BCID panel until further notice"    ***Blood Panel PCR results on this specimen are available    approximately 3 hours after the Gram stain result.***    Gram stain, PCR, and/or culture results may not always    correspond due to difference in methodologies.    ************************************************************    This PCR assay was performed using [employer].    The following targets are tested for: Enterococcus,    vancomycin resistant enterococci, Listeria monocytogenes,    coagulase negative staphylococci, S. aureus,    methicillin resistant S. aureus, Streptococcus agalactiae    (Group B), S. pneumoniae, S. pyogenes (Group A),    Acinetobacter baumannii, Enterobacter cloacae, E. coli,    Klebsiella oxytoca, K. pneumoniae, Proteus sp.,    Serratia marcescens, Haemophilus influenzae,    Neisseria meningitidis, Pseudomonas aeruginosa, Candida    albicans, C. glabrata, C krusei, C parapsilosis,    C. tropicalis and the KPC resistance gene.    Growth in anaerobic bottle: Gram positive cocci in pairs  Organism: Blood Culture PCR  Blood Culture PCR (18 @ 18:27)  Organism: Blood Culture PCR (18 @ 18:27)      -  Coagulase negative Staphylococcus: Detec      Method Type: PCR  Organism: Blood Culture PCR (18 @ 10:26)      -  Acinetobacter baumanii: Detec      -  Enterococcus species: Detec      Method Type: PCR    Culture - Urine (collected 18 @ 18:36)  Source: .Urine Catheterized  Final Report (18 @ 22:08):    No growth    EKG: sinus rhythm    RADIOLOGY & ADDITIONAL STUDIES:  < from: CT Angio Chest w/ IV Cont (18 @ 17:28) >    EXAM:  CT ANGIO CHEST (W)AW IC                          PROCEDURE DATE:  2018      INTERPRETATION:  HISTORY:  Chest pain. Shortness-of-breath. Rule out   pulmonary emboli.    TECHNIQUE : Thin section imaging was performed. 100 cc Omnipaque  350 was   administered with rapid infusion and bolus tracking.  MIP/3D and   Reformatted images were generated from the axial data .      FINDINGS.:    PULMONARY ARTERIES:  No filling defect or intraluminal thrombus is   identified. There is no CT evidence for pulmonary emboli.  AORTA:     Normal in size. No dissection or aneurysmal dilatation is   noted.  LUNGS:   The patient is intubated. No acute infiltrate or consolidation   is noted.  HILUM:     No adenopathy or mass is suggested.  MEDIASTINUM:   The heart is mildly enlarged.   UPPER ABDOMEN :  There are bilateral renal cysts without acute   abnormality. These may be further assessed sonographically.  BONES.:    No fracture or destructive lesion is visualized.    IMPRESSION:    1. No CT evidence of pulmonary embolism.  2. No acute airspace disease. The patient is intubated. Mild cardiomegaly.  3. Bilateral renal cysts. No acute abnormality in the upper abdomen   suggested.    LEILA WILDER M.D., ATTENDING RADIOLOGIST  This document has been electronically signed. Dec 20 2018  6:00PM      ASSESSMENT AND PLAN:  ·	S/P Hypoxic Respiratory failure.  ·	Acute COPD exacerbation.  ·	Anemia.  ·	CAD.  ·	S/P PPm.  ·	HTN.  ·	DM  ·	CVA history.    Continue O2, steroids and nebulizer.  Says was on Coumadin(not sure about reason), started on Lovenox.  On Empiric nebulizer.  Send nasopharyngeal swab for  RVP .

## 2018-12-21 NOTE — DIETITIAN INITIAL EVALUATION ADULT. - OTHER INFO
Pt seen for CCU admission & HgbA1C=10.1%(12/21). Pt lives with daughter & ex-wife. Pt cooks & ex-wife & daughter does food shopping. Pt admitted with respiratory failure due to asthma, pt extubated 12/20 & states he tolerated po diet well today. Pt poorly managed DM with insulin. Pt frequently missed taking Humalog 4units ac meals & states he usually took Lantus 15units HS. Pt inconsistent with SMBG; pt unsure of usual BG levels & unaware of previous HgbA1C level. Pt reports last BM x 1(12/20).

## 2018-12-21 NOTE — PROVIDER CONTACT NOTE (CRITICAL VALUE NOTIFICATION) - TEST AND RESULT REPORTED:
Blood Cx from 12/20/18, preliminary,  positive growth in aerobic bottle. Gram positive cocci in clusters

## 2018-12-22 DIAGNOSIS — I25.10 ATHEROSCLEROTIC HEART DISEASE OF NATIVE CORONARY ARTERY WITHOUT ANGINA PECTORIS: ICD-10-CM

## 2018-12-22 DIAGNOSIS — J45.909 UNSPECIFIED ASTHMA, UNCOMPLICATED: ICD-10-CM

## 2018-12-22 LAB
-  COAGULASE NEGATIVE STAPHYLOCOCCUS: SIGNIFICANT CHANGE UP
ANION GAP SERPL CALC-SCNC: 6 MMOL/L — SIGNIFICANT CHANGE UP (ref 5–17)
BUN SERPL-MCNC: 18 MG/DL — SIGNIFICANT CHANGE UP (ref 7–23)
CALCIUM SERPL-MCNC: 8.2 MG/DL — LOW (ref 8.5–10.1)
CHLORIDE SERPL-SCNC: 111 MMOL/L — HIGH (ref 96–108)
CO2 SERPL-SCNC: 26 MMOL/L — SIGNIFICANT CHANGE UP (ref 22–31)
CREAT SERPL-MCNC: 0.91 MG/DL — SIGNIFICANT CHANGE UP (ref 0.5–1.3)
CULTURE RESULTS: SIGNIFICANT CHANGE UP
FLUAV SPEC QL CULT: NEGATIVE — SIGNIFICANT CHANGE UP
FLUBV AG SPEC QL IA: NEGATIVE — SIGNIFICANT CHANGE UP
GLUCOSE BLDC GLUCOMTR-MCNC: 177 MG/DL — HIGH (ref 70–99)
GLUCOSE BLDC GLUCOMTR-MCNC: 233 MG/DL — HIGH (ref 70–99)
GLUCOSE BLDC GLUCOMTR-MCNC: 264 MG/DL — HIGH (ref 70–99)
GLUCOSE BLDC GLUCOMTR-MCNC: 264 MG/DL — HIGH (ref 70–99)
GLUCOSE SERPL-MCNC: 218 MG/DL — HIGH (ref 70–99)
GRAM STN FLD: SIGNIFICANT CHANGE UP
HCT VFR BLD CALC: 33.7 % — LOW (ref 39–50)
HGB BLD-MCNC: 10.3 G/DL — LOW (ref 13–17)
INR BLD: 1.29 RATIO — HIGH (ref 0.88–1.16)
MAGNESIUM SERPL-MCNC: 2.6 MG/DL — SIGNIFICANT CHANGE UP (ref 1.6–2.6)
MCHC RBC-ENTMCNC: 27.3 PG — SIGNIFICANT CHANGE UP (ref 27–34)
MCHC RBC-ENTMCNC: 30.6 GM/DL — LOW (ref 32–36)
MCV RBC AUTO: 89.4 FL — SIGNIFICANT CHANGE UP (ref 80–100)
METHOD TYPE: SIGNIFICANT CHANGE UP
NRBC # BLD: 0 /100 WBCS — SIGNIFICANT CHANGE UP (ref 0–0)
ORGANISM # SPEC MICROSCOPIC CNT: SIGNIFICANT CHANGE UP
ORGANISM # SPEC MICROSCOPIC CNT: SIGNIFICANT CHANGE UP
PHOSPHATE SERPL-MCNC: 2.6 MG/DL — SIGNIFICANT CHANGE UP (ref 2.5–4.5)
PLATELET # BLD AUTO: 139 K/UL — LOW (ref 150–400)
POTASSIUM SERPL-MCNC: 4 MMOL/L — SIGNIFICANT CHANGE UP (ref 3.5–5.3)
POTASSIUM SERPL-SCNC: 4 MMOL/L — SIGNIFICANT CHANGE UP (ref 3.5–5.3)
PROTHROM AB SERPL-ACNC: 14.6 SEC — HIGH (ref 10–12.9)
RBC # BLD: 3.77 M/UL — LOW (ref 4.2–5.8)
RBC # FLD: 14.7 % — HIGH (ref 10.3–14.5)
SODIUM SERPL-SCNC: 143 MMOL/L — SIGNIFICANT CHANGE UP (ref 135–145)
SPECIMEN SOURCE: SIGNIFICANT CHANGE UP
WBC # BLD: 12.76 K/UL — HIGH (ref 3.8–10.5)
WBC # FLD AUTO: 12.76 K/UL — HIGH (ref 3.8–10.5)

## 2018-12-22 PROCEDURE — 99291 CRITICAL CARE FIRST HOUR: CPT

## 2018-12-22 PROCEDURE — 99232 SBSQ HOSP IP/OBS MODERATE 35: CPT

## 2018-12-22 RX ORDER — IOHEXOL 300 MG/ML
30 INJECTION, SOLUTION INTRAVENOUS ONCE
Qty: 0 | Refills: 0 | Status: COMPLETED | OUTPATIENT
Start: 2018-12-22 | End: 2018-12-23

## 2018-12-22 RX ORDER — ALBUTEROL 90 UG/1
1 AEROSOL, METERED ORAL EVERY 4 HOURS
Qty: 0 | Refills: 0 | Status: DISCONTINUED | OUTPATIENT
Start: 2018-12-22 | End: 2018-12-28

## 2018-12-22 RX ORDER — TIOTROPIUM BROMIDE 18 UG/1
1 CAPSULE ORAL; RESPIRATORY (INHALATION) DAILY
Qty: 0 | Refills: 0 | Status: DISCONTINUED | OUTPATIENT
Start: 2018-12-22 | End: 2018-12-28

## 2018-12-22 RX ORDER — AMPICILLIN SODIUM AND SULBACTAM SODIUM 250; 125 MG/ML; MG/ML
3 INJECTION, POWDER, FOR SUSPENSION INTRAMUSCULAR; INTRAVENOUS EVERY 6 HOURS
Qty: 0 | Refills: 0 | Status: DISCONTINUED | OUTPATIENT
Start: 2018-12-22 | End: 2018-12-28

## 2018-12-22 RX ADMIN — ATORVASTATIN CALCIUM 20 MILLIGRAM(S): 80 TABLET, FILM COATED ORAL at 22:34

## 2018-12-22 RX ADMIN — ALBUTEROL 1 PUFF(S): 90 AEROSOL, METERED ORAL at 14:08

## 2018-12-22 RX ADMIN — Medication 3 MILLILITER(S): at 05:40

## 2018-12-22 RX ADMIN — Medication 6: at 22:35

## 2018-12-22 RX ADMIN — Medication 100 MILLIGRAM(S): at 11:54

## 2018-12-22 RX ADMIN — Medication 40 MILLIGRAM(S): at 05:40

## 2018-12-22 RX ADMIN — CHLORHEXIDINE GLUCONATE 1 APPLICATION(S): 213 SOLUTION TOPICAL at 05:42

## 2018-12-22 RX ADMIN — Medication 3 MILLILITER(S): at 00:40

## 2018-12-22 RX ADMIN — ALBUTEROL 1 PUFF(S): 90 AEROSOL, METERED ORAL at 17:15

## 2018-12-22 RX ADMIN — ENOXAPARIN SODIUM 80 MILLIGRAM(S): 100 INJECTION SUBCUTANEOUS at 18:19

## 2018-12-22 RX ADMIN — Medication 81 MILLIGRAM(S): at 11:54

## 2018-12-22 RX ADMIN — AMPICILLIN SODIUM AND SULBACTAM SODIUM 200 GRAM(S): 250; 125 INJECTION, POWDER, FOR SUSPENSION INTRAMUSCULAR; INTRAVENOUS at 17:15

## 2018-12-22 RX ADMIN — Medication 20 MILLIGRAM(S): at 05:41

## 2018-12-22 RX ADMIN — CARVEDILOL PHOSPHATE 12.5 MILLIGRAM(S): 80 CAPSULE, EXTENDED RELEASE ORAL at 17:16

## 2018-12-22 RX ADMIN — ENOXAPARIN SODIUM 80 MILLIGRAM(S): 100 INJECTION SUBCUTANEOUS at 05:40

## 2018-12-22 RX ADMIN — Medication 2: at 11:53

## 2018-12-22 RX ADMIN — TIOTROPIUM BROMIDE 1 CAPSULE(S): 18 CAPSULE ORAL; RESPIRATORY (INHALATION) at 13:44

## 2018-12-22 RX ADMIN — CARVEDILOL PHOSPHATE 12.5 MILLIGRAM(S): 80 CAPSULE, EXTENDED RELEASE ORAL at 07:43

## 2018-12-22 RX ADMIN — Medication 4: at 07:46

## 2018-12-22 RX ADMIN — Medication 1 MILLIGRAM(S): at 12:17

## 2018-12-22 RX ADMIN — Medication 4 UNIT(S): at 11:54

## 2018-12-22 RX ADMIN — PIPERACILLIN AND TAZOBACTAM 25 GRAM(S): 4; .5 INJECTION, POWDER, LYOPHILIZED, FOR SOLUTION INTRAVENOUS at 05:42

## 2018-12-22 RX ADMIN — INSULIN GLARGINE 15 UNIT(S): 100 INJECTION, SOLUTION SUBCUTANEOUS at 22:33

## 2018-12-22 RX ADMIN — Medication 4 UNIT(S): at 07:50

## 2018-12-22 RX ADMIN — Medication 6: at 16:12

## 2018-12-22 RX ADMIN — PIPERACILLIN AND TAZOBACTAM 25 GRAM(S): 4; .5 INJECTION, POWDER, LYOPHILIZED, FOR SOLUTION INTRAVENOUS at 14:07

## 2018-12-22 RX ADMIN — Medication 4 UNIT(S): at 16:13

## 2018-12-22 RX ADMIN — LISINOPRIL 20 MILLIGRAM(S): 2.5 TABLET ORAL at 05:41

## 2018-12-22 NOTE — CHART NOTE - NSCHARTNOTEFT_GEN_A_CORE
Patient is a 63 years old male with PMHx of Asthma? DM. CAD sp PPM/AICD, CVA, by ems s/p et intubated for respiratory arrest uncler etiology, possible Asthma Attack. Per EMS sts pt was in a restaurant c/o sob and ems noted pt has a neb pump next to him pt was insob, unable to talk. Pt here is et intubated and breath sounds + expiratory wheezing bilaterally. Pt's wife is here at bed side now states pt had asthma attack one week ago was discharge from hospital with steroids  Recent history of PPM/AICD implantation at [facility] as per family.  Patient was extubated on 12/21 to room air, is doing better on prednisone 50mg for total of 5 days  Resumed most of home medication for blood pressure and DM.  BC sent from ER with GPCS and GNR. acinetobacter, staph coag neg contamination? fup with ID on zosyn now  -insulin sliding scale and fingersticks,   -resume home meds  -needs pulmonology consult /follow up outpatient   signed out to Dr Owusu

## 2018-12-22 NOTE — PROGRESS NOTE ADULT - SUBJECTIVE AND OBJECTIVE BOX
HPI:  63 years old male by ems s/p et intubated c/o respiratory arrest. EMS sts pt was in a restaurant c/o sob and ems noted pt has a neb pump next to him pt was sob, unable to talk. Pt here is et intubated and breath sounds + expiratory wheezing bilaterally. Pt's wife is here at bed side now states pt had asthma attack one week ago was discharge from hospital with steroids  Recent history of PPM/AICD implantation at [facility] as per family. Patient seen and evaluated at bedside with ICU attending. CT head negative for acute infarct or bleed. CTA done  No CT evidence of pulmonary embolism. full report in chart. (20 Dec 2018 18:25)    Over 24 Hrs: s/p extubation yesterday, doing well.    PAST MEDICAL & SURGICAL HISTORY:  Diabetes  HTN (hypertension)  CAD (coronary artery disease)  CVA (cerebral vascular accident)  Asthma  Cardiac pacemaker      ## ROS:   CONSTITUTIONAL: No fever, chills  EYES: No eye pain, visual disturbances  ENMT:  No difficulty hearing, No sinus or throat pain  RESPIRATORY: No cough, wheezing, hemoptysis; No shortness of breath  CARDIOVASCULAR: No chest pain, palpitations  GASTROINTESTINAL: No abdominal or epigastric pain. No nausea, vomiting, or hematemesis; No diarrhea. No melena or hematochezia.  GENITOURINARY: No dysuria, frequency, hematuria  NEUROLOGICAL: No headaches  ENDOCRINE: No heat or cold intolerance  MUSCULOSKELETAL: No joint pain or swelling; No muscle, back, or extremity pain    ## O/E:  Vitals: T(C): 36.6 (12-22-18 @ 09:05), Max: 37.2 (12-21-18 @ 12:00)  HR: 71 (12-22-18 @ 10:00) (56 - 92)  BP: 131/63 (12-22-18 @ 10:00) (113/63 - 209/129)  BP(mean): 80 (12-22-18 @ 10:00) (74 - 145)  RR: 20 (12-22-18 @ 10:00) (12 - 24)  SpO2: 98% (12-22-18 @ 10:00) (92% - 100%)  Wt(kg): --  Gen: lying comfortably in bed in no apparent distress  HEENT: PERRL, EOMI  Resp: CTA B/L no c/r/w  CVS: S1S2 no m/r/g  Abd: soft NT/ND +BS  Ext: no c/c/e  Neuro: A&Ox3      12-21 @ 07:01  -  12-22 @ 07:00  --------------------------------------------------------  IN: 1630 mL / OUT: 1060 mL / NET: 570 mL          ## Labs:                        10.3   12.76 )-----------( 139      ( 22 Dec 2018 04:03 )             33.7     12-22    143  |  111<H>  |  18  ----------------------------<  218<H>  4.0   |  26  |  0.91    Ca    8.2<L>      22 Dec 2018 04:03  Phos  2.6     12-22  Mg     2.6     12-22    TPro  7.1  /  Alb  3.4  /  TBili  0.3  /  DBili  x   /  AST  34  /  ALT  31  /  AlkPhos  52  12-20    PT/INR - ( 22 Dec 2018 04:03 )   PT: 14.6 sec;   INR: 1.29 ratio         PTT - ( 20 Dec 2018 15:14 )  PTT:24.9 sec  ABG - ( 21 Dec 2018 08:20 )  pH, Arterial: x     pH, Blood: 7.48  /  pCO2: 31    /  pO2: 127   / HCO3: 23    / Base Excess: 0.4   /  SaO2: 100               CARDIAC MARKERS ( 21 Dec 2018 12:07 )  .294 ng/mL / x     / x     / x     / x      CARDIAC MARKERS ( 21 Dec 2018 04:02 )  .309 ng/mL / x     / 253 U/L / x     / x      CARDIAC MARKERS ( 20 Dec 2018 19:45 )  .272 ng/mL / x     / 314 U/L / x     / x      CARDIAC MARKERS ( 20 Dec 2018 15:14 )  .112 ng/mL / x     / 325 U/L / x     / 5.4 ng/mL        ## Imaging: reviewed    MEDICATIONS  (STANDING):  ALBUTerol    90 MICROgram(s) HFA Inhaler 1 Puff(s) Inhalation every 4 hours  aspirin enteric coated 81 milliGRAM(s) Oral daily  atorvastatin 20 milliGRAM(s) Oral at bedtime  carvedilol 12.5 milliGRAM(s) Oral every 12 hours  chlorhexidine 4% Liquid 1 Application(s) Topical <User Schedule>  dextrose 5%. 1000 milliLiter(s) (50 mL/Hr) IV Continuous <Continuous>  dextrose 50% Injectable 12.5 Gram(s) IV Push once  dextrose 50% Injectable 25 Gram(s) IV Push once  dextrose 50% Injectable 25 Gram(s) IV Push once  enoxaparin Injectable 80 milliGRAM(s) SubCutaneous every 12 hours  folic acid 1 milliGRAM(s) Oral daily  furosemide    Tablet 20 milliGRAM(s) Oral daily  influenza   Vaccine 0.5 milliLiter(s) IntraMuscular once  insulin glargine Injectable (LANTUS) 15 Unit(s) SubCutaneous at bedtime  insulin lispro (HumaLOG) corrective regimen sliding scale   SubCutaneous Before meals and at bedtime  insulin lispro Injectable (HumaLOG) 4 Unit(s) SubCutaneous before breakfast  insulin lispro Injectable (HumaLOG) 4 Unit(s) SubCutaneous before lunch  insulin lispro Injectable (HumaLOG) 4 Unit(s) SubCutaneous before dinner  lisinopril 20 milliGRAM(s) Oral daily  pantoprazole  Injectable 40 milliGRAM(s) IV Push daily  piperacillin/tazobactam IVPB. 3.375 Gram(s) IV Intermittent every 8 hours  predniSONE   Tablet 40 milliGRAM(s) Oral daily  thiamine 100 milliGRAM(s) Oral daily  tiotropium 18 MICROgram(s) Capsule 1 Capsule(s) Inhalation daily    MEDICATIONS  (PRN):  dextrose 40% Gel 15 Gram(s) Oral once PRN Blood Glucose LESS THAN 70 milliGRAM(s)/deciliter  glucagon  Injectable 1 milliGRAM(s) IntraMuscular once PRN Glucose LESS THAN 70 milligrams/deciliter      ## Code status:  Goals of care discussion: [x] yes [ ] no  [x] full code  [ ] DNR  [ ] DNI  [ ] CATHY

## 2018-12-22 NOTE — CONSULT NOTE ADULT - SUBJECTIVE AND OBJECTIVE BOX
HPI:  63 years old male by ems s/p et intubated c/o respiratory arrest. EMS sts pt was in a restaurant c/o sob and ems noted pt has a neb pump next to him pt was sob, unable to talk. Pt here is et intubated and breath sounds + expiratory wheezing bilaterally. Pt's wife is here at bed side now states pt had asthma attack one week ago was discharge from hospital with steroids  Recent history of PPM/AICD implantation at [facility] as per family. Patient seen and evaluated at bedside with ICU attending. CT head negative for acute infarct or bleed. CTA done  No CT evidence of pulmonary embolism.   now extubated yesterday   looks and feels great     PAST MEDICAL & SURGICAL HISTORY:  Diabetes  HTN (hypertension)  CAD (coronary artery disease)  CVA (cerebral vascular accident)  Asthma  Cardiac pacemaker      SOCHX:   ex tobacco,  ex-  alcohol used to drink 16 beers a day at times ; all quit 7 years ago   daily weed which he says he stopped also 7 years ago     FMHX: FA/MO  non contributory       Recent Travel:    Immunizations:    Allergies    No Known Allergies    Intolerances        MEDICATIONS  (STANDING):  ALBUTerol    90 MICROgram(s) HFA Inhaler 1 Puff(s) Inhalation every 4 hours  aspirin enteric coated 81 milliGRAM(s) Oral daily  atorvastatin 20 milliGRAM(s) Oral at bedtime  carvedilol 12.5 milliGRAM(s) Oral every 12 hours  chlorhexidine 4% Liquid 1 Application(s) Topical <User Schedule>  dextrose 5%. 1000 milliLiter(s) (50 mL/Hr) IV Continuous <Continuous>  dextrose 50% Injectable 12.5 Gram(s) IV Push once  dextrose 50% Injectable 25 Gram(s) IV Push once  dextrose 50% Injectable 25 Gram(s) IV Push once  enoxaparin Injectable 80 milliGRAM(s) SubCutaneous every 12 hours  folic acid 1 milliGRAM(s) Oral daily  furosemide    Tablet 20 milliGRAM(s) Oral daily  influenza   Vaccine 0.5 milliLiter(s) IntraMuscular once  insulin glargine Injectable (LANTUS) 15 Unit(s) SubCutaneous at bedtime  insulin lispro (HumaLOG) corrective regimen sliding scale   SubCutaneous Before meals and at bedtime  insulin lispro Injectable (HumaLOG) 4 Unit(s) SubCutaneous before breakfast  insulin lispro Injectable (HumaLOG) 4 Unit(s) SubCutaneous before lunch  insulin lispro Injectable (HumaLOG) 4 Unit(s) SubCutaneous before dinner  lisinopril 20 milliGRAM(s) Oral daily  piperacillin/tazobactam IVPB. 3.375 Gram(s) IV Intermittent every 8 hours  predniSONE   Tablet 40 milliGRAM(s) Oral daily  thiamine 100 milliGRAM(s) Oral daily  tiotropium 18 MICROgram(s) Capsule 1 Capsule(s) Inhalation daily    MEDICATIONS  (PRN):  dextrose 40% Gel 15 Gram(s) Oral once PRN Blood Glucose LESS THAN 70 milliGRAM(s)/deciliter  glucagon  Injectable 1 milliGRAM(s) IntraMuscular once PRN Glucose LESS THAN 70 milligrams/deciliter      REVIEW OF SYSTEMS:  CONSTITUTIONAL: No fever,   has had some weight loss, and  fatigue last few weeks   EYES: No eye pain, visual disturbances, or discharge  ENMT:  No difficulty hearing, tinnitus, vertigo; No sinus or throat pain  NECK: No pain or stiffness  RESPIRATORY: No cough, wheezing, chills or hemoptysis;  main issue is  shortness of breath  CARDIOVASCULAR: No chest pain, palpitations, dizziness, or leg swelling  GASTROINTESTINAL: No abdominal or epigastric pain. No nausea, vomiting, or hematemesis; No diarrhea or constipation. No melena or hematochezia.  GENITOURINARY: No dysuria, frequency, hematuria, or incontinence  NEUROLOGICAL: No headaches, memory loss, loss of strength, numbness, or tremors  SKIN: No itching, burning, rashes, or lesions   LYMPH NODES: No enlarged glands  ENDOCRINE: No heat or cold intolerance; No hair loss  MUSCULOSKELETAL: No joint pain or swelling; No muscle, back,   left hand abnd arm pain and swelling   PSYCHIATRIC: No depression, anxiety, mood swings, or difficulty sleeping  HEME/LYMPH: No easy bruising, or bleeding gums  ALLERGY AND IMMUNOLOGIC: No hives or eczema    VITAL SIGNS:    T(C): 36.1 (18 @ 15:39), Max: 37.1 (18 @ 15:58)  T(F): 97 (18 @ 15:39), Max: 98.8 (18 @ 15:58)  HR: 76 (18 @ 13:00) (56 - 92)  BP: 140/79 (18 @ 13:00) (113/63 - 209/129)  RR: 21 (18 @ 13:00) (12 - 24)  SpO2: 96% (18 @ 13:00) (92% - 100%)    PHYSICAL EXAM:    GENERAL: NAD, well-groomed, well-developed  HEAD:  Atraumatic, Normocephalic  EYES: EOMI, PERRLA, conjunctiva and sclera clear  ENMT:Moist mucous membranes,   NECK: Supple, No JVD, Normal thyroid  NERVOUS SYSTEM:  Alert & Oriented X3, Good concentration; Motor Strength 5/5 B/L upper and lower extremities;  CHEST/LUNG: Clear to auscultation bilaterally; No rales, rhonchi, wheezing bilaterally  HEART: Regular rate and rhythm; No murmurs, rubs, or gallops  ABDOMEN: Soft, Nontender, Nondistended; Bowel sounds present  EXTREMITIES:  2+ Peripheral Pulses, No clubbing, cyanosis, or edema  resolving rt arm phlebitis   LYMPH: No lymphadenopathy noted  SKIN: No rashes or lesions  BACK: no pressor sore     LABS:                         10.3   12.76 )-----------( 139      ( 22 Dec 2018 04:03 )             33.7     12-22    143  |  111<H>  |  18  ----------------------------<  218<H>  4.0   |  26  |  0.91    Ca    8.2<L>      22 Dec 2018 04:03  Phos  2.6     12-22  Mg     2.6     12-22        PT/INR - ( 22 Dec 2018 04:03 )   PT: 14.6 sec;   INR: 1.29 ratio           Urinalysis Basic - ( 20 Dec 2018 15:59 )    Color: Yellow / Appearance: Clear / S.020 / pH: x  Gluc: x / Ketone: Negative  / Bili: Negative / Urobili: Negative mg/dL   Blood: x / Protein: 100 mg/dL / Nitrite: Negative   Leuk Esterase: Negative / RBC: 0-2 /HPF / WBC 0-2   Sq Epi: x / Non Sq Epi: Negative / Bacteria: Few      ABG - ( 21 Dec 2018 08:20 )  pH, Arterial: x     pH, Blood: 7.48  /  pCO2: 31    /  pO2: 127   / HCO3: 23    / Base Excess: 0.4   /  SaO2: 100               CARDIAC MARKERS ( 21 Dec 2018 12:07 )  .294 ng/mL / x     / x     / x     / x      CARDIAC MARKERS ( 21 Dec 2018 04:02 )  .309 ng/mL / x     / 253 U/L / x     / x      CARDIAC MARKERS ( 20 Dec 2018 19:45 )  .272 ng/mL / x     / 314 U/L / x     / x                        Culture Results:   Growth in aerobic bottle: Enterococcus faecium and Pantoea agglomerans  Growth in anaerobic bottle: Coag Negative Staphylococcus  Single set isolate, possible contaminant. Contact  Microbiology if susceptibility testing clinically  indicated.  "Dueto technical problems, Proteus sp. will Not be reported as part of  the BCID panel until further notice"  ***Blood Panel PCR results on this specimen are available  approximately 3 hours after the Gram stain result.***  Gram stain, PCR, and/or culture results may not always  correspond due to difference in methodologies.  ************************************************************  This PCR assay was performed using "[employer].  The following targets are tested for: Enterococcus,  vancomycin resistant enterococci, Listeria monocytogenes,  coagulase negative staphylococci, S. aureus,  methicillin resistant S. aureus, Streptococcus agalactiae  (Group B), S. pneumoniae, S. pyogenes (Group A),  Acinetobacter baumannii, Enterobacter cloacae, E. coli,  Klebsiella oxytoca, K. pneumoniae, Proteus sp.,  Serratia marcescens, Haemophilus influenzae,  Neisseria meningitidis, Pseudomonas aeruginosa, Candida  albicans, C. glabrata, C krusei, C parapsilosis,  C. tropicalis and the KPC resistancegene. ( @ 19:02)  Culture Results:   Growth in aerobic bottle: Gram Positive Cocci in Clusters  "Due to technical problems, Proteus sp. will Not be reported as part of  the BCID panel until further notice"  ***Blood Panel PCR results on this specimen are available  approximately 3 hours after the Gram stain result.***  Gram stain, PCR, and/or culture results may not always  correspond due to difference in methodologies.  ************************************************************  This PCR assay was performed using "[employer].  The following targets are tested for: Enterococcus,  vancomycin resistant enterococci, Listeria monocytogenes,  coagulase negative staphylococci, S. aureus,  methicillin resistant S. aureus, Streptococcus agalactiae  (Group B), S. pneumoniae, S.pyogenes (Group A),  Acinetobacter baumannii, Enterobacter cloacae, E. coli,  Klebsiella oxytoca, K. pneumoniae, Proteus sp.,  Serratia marcescens, Haemophilus influenzae,  Neisseria meningitidis, Pseudomonas aeruginosa, Candida  albicans, C. glabrata, C krusei, C parapsilosis,  C. tropicalis and the KPC resistance gene. ( @ 19:02)  Culture Results:   No growth ( @ 18:36)                Radiology:    < from: Xray Chest 1 View AP/PA. (18 @ 07:36) >  IMPRESSION:  No acute airspace disease or consolidation suggested. Support devices in   situ.                LEILA WILDER M.D., ATTENDING RADIOLOGIST  This document has been electronically signed. Dec 21 2018 11:03AM                < end of copied text >

## 2018-12-22 NOTE — PROGRESS NOTE ADULT - SUBJECTIVE AND OBJECTIVE BOX
HPI:  63 years old male by ems s/p et intubated c/o respiratory arrest. pt was in a restaurant c/o sob and ems noted pt has a neb pump next to him pt was sob, unable to talk. Pt here is et intubated and breath sounds + expiratory wheezing bilaterally. He had asthma attack one week ago was discharge from hospital with steroids  Recent history of PPM/AICD implantation at [facility]. CT head negative for acute infarct or bleed. CTA done  No CT evidence of pulmonary embolism. Feels fine today. occasional arm edema.    ALLERGIES:  No Known Allergies    REVIEW OF SYSTEMS:  General:  No wt loss, fevers, chills, night sweats  Eyes:  Good vision, no reported pain  ENT:  No sore throat, pain, runny nose, dysphagia  CV:  No pain, palpitations, hypo/hypertension  Resp:  No dyspnea, cough, tachypnea, wheezing  GI:  No pain, nausea, vomiting, diarrhea, constipation  :  No pain, bleeding, incontinence, nocturia  Muscle:  No pain, weakness  Breast:  No pain, abscess, mass, discharge  Neuro:  No weakness, tingling, memory problems  Psych:  No fatigue, insomnia, mood problems, depression  Endocrine:  No polyuria, polydipsia, cold/heat intolerance  Heme:  No petechiae, ecchymosis, easy bruisability  Skin:  No rash, arm edema L>R    PHYSICAL EXAM:  ICU Vital Signs Last 24 Hrs  T(C): 36.4 (22 Dec 2018 11:48), Max: 37.1 (21 Dec 2018 15:58)  T(F): 97.6 (22 Dec 2018 11:48), Max: 98.8 (21 Dec 2018 15:58)  HR: 76 (22 Dec 2018 13:00) (56 - 92)  BP: 140/79 (22 Dec 2018 13:00) (113/63 - 209/129)  BP(mean): 93 (22 Dec 2018 13:00) (74 - 145)  RR: 21 (22 Dec 2018 13:00) (12 - 24)  SpO2: 96% (22 Dec 2018 13:00) (92% - 100%)    Tele: SR  General:  Appears stated age, well-groomed, well-nourished, no distress  HEENT:  NC/AT, patent nares w/ pink mucosa, OP clear w/o lesions, PERRL, EOMI, conjunctivae clear, no thyromegaly, nodules, adenopathy, no JVD  Chest:  Full & symmetric excursion, no increased effort, breath sounds clear  Cardiovascular:  Regular rhythm, S1, S2, no murmur  Abdomen:  Soft, non-tender, non-distended, normoactive bowel sounds  Extremities:  no edema  Skin:  skin is warm/dry  Musculoskeletal:  Full ROM in all joints w/o swelling/tenderness/effusion    LABORATORY:                        10.3   12.76 )-----------( 139      ( 22 Dec 2018 04:03 )             33.7     12-22    143  |  111<H>  |  18  ----------------------------<  218<H>  4.0   |  26  |  0.91    Ca    8.2<L>      22 Dec 2018 04:03  Phos  2.6     12-22  Mg     2.6     12-22    TPro  7.1  /  Alb  3.4  /  TBili  0.3  /  DBili  x   /  AST  34  /  ALT  31  /  AlkPhos  52  12-20      IMAGING:  ECG: SR, LVH    < from: Xray Chest 1 View AP/PA. (12.21.18 @ 07:36) >  No acute airspace disease or consolidation suggested. Support devices in   situ.    < end of copied text >    ASSESSMENT:  63 years old male by ems s/p et intubated c/o respiratory arrest. pt was in a restaurant c/o sob and ems noted pt has a neb pump next to him pt was sob, unable to talk. Pt here is et intubated and breath sounds + expiratory wheezing bilaterally. He had asthma attack one week ago was discharge from hospital with steroids  Recent history of PPM/AICD implantation at [facility] as per family. Patient seen and evaluated at bedside with ICU attending. CT head negative for acute infarct or bleed. CTA done  No CT evidence of pulmonary embolism.    PLAN:     Check echo.  C/w diuresis with greater fluid out than in.  MEDICATIONS  (STANDING):  ALBUTerol    90 MICROgram(s) HFA Inhaler 1 Puff(s) Inhalation every 4 hours  aspirin enteric coated 81 milliGRAM(s) Oral daily  atorvastatin 20 milliGRAM(s) Oral at bedtime  carvedilol 12.5 milliGRAM(s) Oral every 12 hours  enoxaparin Injectable 80 milliGRAM(s) SubCutaneous every 12 hours  folic acid 1 milliGRAM(s) Oral daily  furosemide    Tablet 20 milliGRAM(s) Oral daily  influenza   Vaccine 0.5 milliLiter(s) IntraMuscular once  insulin glargine Injectable (LANTUS) 15 Unit(s) SubCutaneous at bedtime  insulin lispro (HumaLOG) corrective regimen sliding scale   SubCutaneous Before meals and at bedtime  insulin lispro Injectable (HumaLOG) 4 Unit(s) SubCutaneous before breakfast  insulin lispro Injectable (HumaLOG) 4 Unit(s) SubCutaneous before lunch  insulin lispro Injectable (HumaLOG) 4 Unit(s) SubCutaneous before dinner  lisinopril 20 milliGRAM(s) Oral daily  piperacillin/tazobactam IVPB. 3.375 Gram(s) IV Intermittent every 8 hours  predniSONE   Tablet 40 milliGRAM(s) Oral daily  thiamine 100 milliGRAM(s) Oral daily  tiotropium 18 MICROgram(s) Capsule 1 Capsule(s) Inhalation daily    Supportive care.    Gutierrez Stovall MD, FACC, DENITA, SHARLA, FACP  Director, Heart Failure Services  [facility]  , Department of Cardiology  [facility]

## 2018-12-22 NOTE — CONSULT NOTE ADULT - ASSESSMENT
resolved ac resp failure from ??   benzos in system ?? from ??   blood culture ; single set anerobic cns;; aerobic e faecium and acinetobacter   likely contaminants   however will do ct abdominal and pelvis   to rule out source   cardiac follow up ?? ac resp failure from cardiac issues   await sensitivity   antibiotics for now   will follow with you thanks

## 2018-12-22 NOTE — PROGRESS NOTE ADULT - SUBJECTIVE AND OBJECTIVE BOX
INTERVAL HPI:  63 years old male by ems s/p et intubated c/o respiratory arrest. EMS sts pt was in a restaurant c/o sob and ems noted pt has a neb pump next to him pt was sob, unable to talk. Pt here is et intubated and breath sounds + expiratory wheezing bilaterally. Pt's wife is here at bed side now states pt had asthma attack one week ago was discharge from hospital with steroids  Recent history of PPM/AICD implantation at [facility] as per family. Patient seen and evaluated at bedside with ICU attending. CT head negative for acute infarct or bleed. CTA done  No CT evidence of pulmonary embolism. full report in chart. (2  Admitted to ICU with Respiratory failure .  12/21/18: extubated.  12/21/18:  Says he was having cough , runny nose and scratchy throat few days prior to his current attack.  Smoked 35-40 years, quit 6 years ago, has Asthma for 20 years.  12/22/18: out of ICU.    OVERNIGHT EVENTS:  Comfortable    Vital Signs Last 24 Hrs  T(C): 36.1 (22 Dec 2018 18:41), Max: 36.6 (21 Dec 2018 23:29)  T(F): 97 (22 Dec 2018 18:41), Max: 97.9 (21 Dec 2018 23:29)  HR: 63 (22 Dec 2018 18:41) (56 - 83)  BP: 139/97 (22 Dec 2018 18:41) (113/63 - 209/129)  BP(mean): 91 (22 Dec 2018 18:09) (74 - 145)  RR: 19 (22 Dec 2018 18:41) (12 - 24)  SpO2: 96% (22 Dec 2018 18:41) (92% - 100%)    PHYSICAL EXAM:  GEN:         Awake, responsive and comfortable.  HEENT:    Normal.    RESP:       no wheezing.  CVS:          Regular rate and rhythm.   ABD:         Soft, non-tender, non-distended;     MEDICATIONS  (STANDING):  ALBUTerol    90 MICROgram(s) HFA Inhaler 1 Puff(s) Inhalation every 4 hours  ampicillin/sulbactam  IVPB 3 Gram(s) IV Intermittent every 6 hours  aspirin enteric coated 81 milliGRAM(s) Oral daily  atorvastatin 20 milliGRAM(s) Oral at bedtime  carvedilol 12.5 milliGRAM(s) Oral every 12 hours  chlorhexidine 4% Liquid 1 Application(s) Topical <User Schedule>  dextrose 5%. 1000 milliLiter(s) (50 mL/Hr) IV Continuous <Continuous>  dextrose 50% Injectable 12.5 Gram(s) IV Push once  dextrose 50% Injectable 25 Gram(s) IV Push once  dextrose 50% Injectable 25 Gram(s) IV Push once  enoxaparin Injectable 80 milliGRAM(s) SubCutaneous every 12 hours  folic acid 1 milliGRAM(s) Oral daily  furosemide    Tablet 20 milliGRAM(s) Oral daily  influenza   Vaccine 0.5 milliLiter(s) IntraMuscular once  insulin glargine Injectable (LANTUS) 15 Unit(s) SubCutaneous at bedtime  insulin lispro (HumaLOG) corrective regimen sliding scale   SubCutaneous Before meals and at bedtime  insulin lispro Injectable (HumaLOG) 4 Unit(s) SubCutaneous before breakfast  insulin lispro Injectable (HumaLOG) 4 Unit(s) SubCutaneous before lunch  insulin lispro Injectable (HumaLOG) 4 Unit(s) SubCutaneous before dinner  iohexol 300 mG (iodine)/mL Oral Solution 30 milliLiter(s) Oral once  lisinopril 20 milliGRAM(s) Oral daily  predniSONE   Tablet 40 milliGRAM(s) Oral daily  thiamine 100 milliGRAM(s) Oral daily  tiotropium 18 MICROgram(s) Capsule 1 Capsule(s) Inhalation daily    MEDICATIONS  (PRN):  dextrose 40% Gel 15 Gram(s) Oral once PRN Blood Glucose LESS THAN 70 milliGRAM(s)/deciliter  glucagon  Injectable 1 milliGRAM(s) IntraMuscular once PRN Glucose LESS THAN 70 milligrams/deciliter    LABS:                        10.3   12.76 )-----------( 139      ( 22 Dec 2018 04:03 )             33.7     12-22    143  |  111<H>  |  18  ----------------------------<  218<H>  4.0   |  26  |  0.91    Ca    8.2<L>      22 Dec 2018 04:03  Phos  2.6     12-22  Mg     2.6     12-22    PT/INR - ( 22 Dec 2018 04:03 )   PT: 14.6 sec;   INR: 1.29 ratio      12-21 @ 08:20  pH: 7.48  pCO2: 31  pO2: 127  SaO2: 100  12-20 @ 21:37  pH: 7.32  pCO2: 46  pO2: 134  SaO2: 99  12-20 @ 14:45  pH: 7.26  pCO2: 54  pO2: 388  SaO2: 100    ASSESSMENT AND PLAN:  ·	S/P Hypoxic Respiratory failure.  ·	Acute COPD exacerbation.  ·	Acinetobacter in blood.  ·	Anemia.  ·	CAD.  ·	S/P PPm.  ·	HTN.  ·	DM  ·	CVA history.    Continue antibiotics, steroids and nebulizer.

## 2018-12-22 NOTE — PROGRESS NOTE ADULT - ASSESSMENT
64 y/o  male PMH Asthma? DM. CAD sp PPM/AICD, CVA,  admitted with respiratory distress requiring intubation in the field by EMS, unclear etiology, possible 2/2 asthma exacerbation.  CTA negative for PE or acute airspace disease. CT head with old infarcts.    -S/P extubation yesterday, doing well  -cont on due nebs, taper steroids  -Pulm fup on floors and outpt.  -BC sent from ER with GPCS and GNR. acinetobacter, staph coag neg contamination? fup with ID  -repeat blood c/s, cont zosyn, fup c/s  -insulin sliding scale and fingersticks,   -resume home meds  -check A1C, diabetic teaching, ADA diet.       CC time: 40 min

## 2018-12-23 DIAGNOSIS — N17.9 ACUTE KIDNEY FAILURE, UNSPECIFIED: ICD-10-CM

## 2018-12-23 DIAGNOSIS — I63.9 CEREBRAL INFARCTION, UNSPECIFIED: ICD-10-CM

## 2018-12-23 DIAGNOSIS — I82.629 ACUTE EMBOLISM AND THROMBOSIS OF DEEP VEINS OF UNSPECIFIED UPPER EXTREMITY: ICD-10-CM

## 2018-12-23 DIAGNOSIS — E11.8 TYPE 2 DIABETES MELLITUS WITH UNSPECIFIED COMPLICATIONS: ICD-10-CM

## 2018-12-23 DIAGNOSIS — J45.909 UNSPECIFIED ASTHMA, UNCOMPLICATED: ICD-10-CM

## 2018-12-23 DIAGNOSIS — I10 ESSENTIAL (PRIMARY) HYPERTENSION: ICD-10-CM

## 2018-12-23 DIAGNOSIS — Z29.9 ENCOUNTER FOR PROPHYLACTIC MEASURES, UNSPECIFIED: ICD-10-CM

## 2018-12-23 DIAGNOSIS — R78.81 BACTEREMIA: ICD-10-CM

## 2018-12-23 DIAGNOSIS — J96.01 ACUTE RESPIRATORY FAILURE WITH HYPOXIA: ICD-10-CM

## 2018-12-23 LAB
-  AMIKACIN: SIGNIFICANT CHANGE UP
-  AMPICILLIN/SULBACTAM: SIGNIFICANT CHANGE UP
-  AMPICILLIN: SIGNIFICANT CHANGE UP
-  AMPICILLIN: SIGNIFICANT CHANGE UP
-  AZTREONAM: SIGNIFICANT CHANGE UP
-  CEFAZOLIN: SIGNIFICANT CHANGE UP
-  CEFEPIME: SIGNIFICANT CHANGE UP
-  CEFOXITIN: SIGNIFICANT CHANGE UP
-  CEFTRIAXONE: SIGNIFICANT CHANGE UP
-  CIPROFLOXACIN: SIGNIFICANT CHANGE UP
-  ERTAPENEM: SIGNIFICANT CHANGE UP
-  GENTAMICIN SYNERGY: SIGNIFICANT CHANGE UP
-  GENTAMICIN: SIGNIFICANT CHANGE UP
-  IMIPENEM: SIGNIFICANT CHANGE UP
-  LEVOFLOXACIN: SIGNIFICANT CHANGE UP
-  MEROPENEM: SIGNIFICANT CHANGE UP
-  PIPERACILLIN/TAZOBACTAM: SIGNIFICANT CHANGE UP
-  TOBRAMYCIN: SIGNIFICANT CHANGE UP
-  TRIMETHOPRIM/SULFAMETHOXAZOLE: SIGNIFICANT CHANGE UP
-  VANCOMYCIN: SIGNIFICANT CHANGE UP
ANION GAP SERPL CALC-SCNC: 6 MMOL/L — SIGNIFICANT CHANGE UP (ref 5–17)
BUN SERPL-MCNC: 18 MG/DL — SIGNIFICANT CHANGE UP (ref 7–23)
CALCIUM SERPL-MCNC: 8 MG/DL — LOW (ref 8.5–10.1)
CHLORIDE SERPL-SCNC: 111 MMOL/L — HIGH (ref 96–108)
CO2 SERPL-SCNC: 27 MMOL/L — SIGNIFICANT CHANGE UP (ref 22–31)
CREAT SERPL-MCNC: 0.8 MG/DL — SIGNIFICANT CHANGE UP (ref 0.5–1.3)
GLUCOSE BLDC GLUCOMTR-MCNC: 102 MG/DL — HIGH (ref 70–99)
GLUCOSE BLDC GLUCOMTR-MCNC: 130 MG/DL — HIGH (ref 70–99)
GLUCOSE BLDC GLUCOMTR-MCNC: 231 MG/DL — HIGH (ref 70–99)
GLUCOSE BLDC GLUCOMTR-MCNC: 260 MG/DL — HIGH (ref 70–99)
GLUCOSE SERPL-MCNC: 108 MG/DL — HIGH (ref 70–99)
HCT VFR BLD CALC: 32.9 % — LOW (ref 39–50)
HGB BLD-MCNC: 10.3 G/DL — LOW (ref 13–17)
MAGNESIUM SERPL-MCNC: 2.3 MG/DL — SIGNIFICANT CHANGE UP (ref 1.6–2.6)
MCHC RBC-ENTMCNC: 28 PG — SIGNIFICANT CHANGE UP (ref 27–34)
MCHC RBC-ENTMCNC: 31.3 GM/DL — LOW (ref 32–36)
MCV RBC AUTO: 89.4 FL — SIGNIFICANT CHANGE UP (ref 80–100)
METHOD TYPE: SIGNIFICANT CHANGE UP
METHOD TYPE: SIGNIFICANT CHANGE UP
NRBC # BLD: 0 /100 WBCS — SIGNIFICANT CHANGE UP (ref 0–0)
PHOSPHATE SERPL-MCNC: 1.6 MG/DL — LOW (ref 2.5–4.5)
PLATELET # BLD AUTO: 135 K/UL — LOW (ref 150–400)
POTASSIUM SERPL-MCNC: 3.6 MMOL/L — SIGNIFICANT CHANGE UP (ref 3.5–5.3)
POTASSIUM SERPL-SCNC: 3.6 MMOL/L — SIGNIFICANT CHANGE UP (ref 3.5–5.3)
RBC # BLD: 3.68 M/UL — LOW (ref 4.2–5.8)
RBC # FLD: 14.6 % — HIGH (ref 10.3–14.5)
SODIUM SERPL-SCNC: 144 MMOL/L — SIGNIFICANT CHANGE UP (ref 135–145)
WBC # BLD: 9.49 K/UL — SIGNIFICANT CHANGE UP (ref 3.8–10.5)
WBC # FLD AUTO: 9.49 K/UL — SIGNIFICANT CHANGE UP (ref 3.8–10.5)

## 2018-12-23 PROCEDURE — 99233 SBSQ HOSP IP/OBS HIGH 50: CPT

## 2018-12-23 PROCEDURE — 74177 CT ABD & PELVIS W/CONTRAST: CPT | Mod: 26

## 2018-12-23 PROCEDURE — 93971 EXTREMITY STUDY: CPT | Mod: 26,LT

## 2018-12-23 RX ORDER — POTASSIUM PHOSPHATE, MONOBASIC POTASSIUM PHOSPHATE, DIBASIC 236; 224 MG/ML; MG/ML
15 INJECTION, SOLUTION INTRAVENOUS ONCE
Qty: 0 | Refills: 0 | Status: COMPLETED | OUTPATIENT
Start: 2018-12-23 | End: 2018-12-23

## 2018-12-23 RX ORDER — ENOXAPARIN SODIUM 100 MG/ML
80 INJECTION SUBCUTANEOUS
Qty: 0 | Refills: 0 | Status: DISCONTINUED | OUTPATIENT
Start: 2018-12-23 | End: 2018-12-23

## 2018-12-23 RX ORDER — BUDESONIDE AND FORMOTEROL FUMARATE DIHYDRATE 160; 4.5 UG/1; UG/1
2 AEROSOL RESPIRATORY (INHALATION)
Qty: 0 | Refills: 0 | Status: DISCONTINUED | OUTPATIENT
Start: 2018-12-23 | End: 2018-12-28

## 2018-12-23 RX ADMIN — AMPICILLIN SODIUM AND SULBACTAM SODIUM 200 GRAM(S): 250; 125 INJECTION, POWDER, FOR SUSPENSION INTRAMUSCULAR; INTRAVENOUS at 17:25

## 2018-12-23 RX ADMIN — TIOTROPIUM BROMIDE 1 CAPSULE(S): 18 CAPSULE ORAL; RESPIRATORY (INHALATION) at 11:30

## 2018-12-23 RX ADMIN — ALBUTEROL 1 PUFF(S): 90 AEROSOL, METERED ORAL at 17:23

## 2018-12-23 RX ADMIN — ALBUTEROL 1 PUFF(S): 90 AEROSOL, METERED ORAL at 21:41

## 2018-12-23 RX ADMIN — Medication 4: at 21:39

## 2018-12-23 RX ADMIN — AMPICILLIN SODIUM AND SULBACTAM SODIUM 200 GRAM(S): 250; 125 INJECTION, POWDER, FOR SUSPENSION INTRAMUSCULAR; INTRAVENOUS at 06:10

## 2018-12-23 RX ADMIN — ENOXAPARIN SODIUM 80 MILLIGRAM(S): 100 INJECTION SUBCUTANEOUS at 17:25

## 2018-12-23 RX ADMIN — ALBUTEROL 1 PUFF(S): 90 AEROSOL, METERED ORAL at 06:10

## 2018-12-23 RX ADMIN — ALBUTEROL 1 PUFF(S): 90 AEROSOL, METERED ORAL at 00:41

## 2018-12-23 RX ADMIN — Medication 1 MILLIGRAM(S): at 12:20

## 2018-12-23 RX ADMIN — Medication 6: at 16:17

## 2018-12-23 RX ADMIN — Medication 4 UNIT(S): at 08:15

## 2018-12-23 RX ADMIN — Medication 4 UNIT(S): at 16:17

## 2018-12-23 RX ADMIN — Medication 81 MILLIGRAM(S): at 12:20

## 2018-12-23 RX ADMIN — ALBUTEROL 1 PUFF(S): 90 AEROSOL, METERED ORAL at 13:20

## 2018-12-23 RX ADMIN — CARVEDILOL PHOSPHATE 12.5 MILLIGRAM(S): 80 CAPSULE, EXTENDED RELEASE ORAL at 18:23

## 2018-12-23 RX ADMIN — Medication 40 MILLIGRAM(S): at 06:11

## 2018-12-23 RX ADMIN — Medication 20 MILLIGRAM(S): at 06:11

## 2018-12-23 RX ADMIN — LISINOPRIL 20 MILLIGRAM(S): 2.5 TABLET ORAL at 06:11

## 2018-12-23 RX ADMIN — CARVEDILOL PHOSPHATE 12.5 MILLIGRAM(S): 80 CAPSULE, EXTENDED RELEASE ORAL at 06:16

## 2018-12-23 RX ADMIN — IOHEXOL 30 MILLILITER(S): 300 INJECTION, SOLUTION INTRAVENOUS at 08:26

## 2018-12-23 RX ADMIN — AMPICILLIN SODIUM AND SULBACTAM SODIUM 200 GRAM(S): 250; 125 INJECTION, POWDER, FOR SUSPENSION INTRAMUSCULAR; INTRAVENOUS at 00:22

## 2018-12-23 RX ADMIN — ATORVASTATIN CALCIUM 20 MILLIGRAM(S): 80 TABLET, FILM COATED ORAL at 21:44

## 2018-12-23 RX ADMIN — Medication 100 MILLIGRAM(S): at 13:19

## 2018-12-23 RX ADMIN — AMPICILLIN SODIUM AND SULBACTAM SODIUM 200 GRAM(S): 250; 125 INJECTION, POWDER, FOR SUSPENSION INTRAMUSCULAR; INTRAVENOUS at 11:35

## 2018-12-23 RX ADMIN — ALBUTEROL 1 PUFF(S): 90 AEROSOL, METERED ORAL at 09:46

## 2018-12-23 RX ADMIN — Medication 4 UNIT(S): at 11:32

## 2018-12-23 RX ADMIN — INSULIN GLARGINE 15 UNIT(S): 100 INJECTION, SOLUTION SUBCUTANEOUS at 21:41

## 2018-12-23 RX ADMIN — POTASSIUM PHOSPHATE, MONOBASIC POTASSIUM PHOSPHATE, DIBASIC 63.75 MILLIMOLE(S): 236; 224 INJECTION, SOLUTION INTRAVENOUS at 11:31

## 2018-12-23 RX ADMIN — ENOXAPARIN SODIUM 80 MILLIGRAM(S): 100 INJECTION SUBCUTANEOUS at 06:12

## 2018-12-23 NOTE — PROGRESS NOTE ADULT - PROBLEM SELECTOR PLAN 5
Left Lower ext DVT, start therapeutic Lovenox for now.   consider Xerolto on discharge. cont therapeutic Lovenox for now.   consider Xerolto on discharge.

## 2018-12-23 NOTE — CHART NOTE - NSCHARTNOTEFT_GEN_A_CORE
Called by RN to evaluate pt who reported swelling of left forearm.   HPI: 63 year old male with PMHx asthma brought in by EMS s/p respiratory arrest now extubated. Pt reports noting swelling of left forearm today. Inner forearm swollen; no warmth ,redness or pain  Plan  Venous doppler of LUE to R/O DVT

## 2018-12-23 NOTE — PROGRESS NOTE ADULT - SUBJECTIVE AND OBJECTIVE BOX
HPI:  63 years old male by ems s/p intubated c/o respiratory arrest. pt was in a restaurant c/o sob and ems noted pt has a neb pump next to him pt was sob, unable to talk. He had asthma attack one week ago was discharge from hospital with steroids  Recent history of PPM/AICD implantation at [facility]. CT head negative for acute infarct or bleed. CTA done  No CT evidence of pulmonary embolism.    Positive deep vein thrombus within proximal left arm basilic veins and   nonocclusive focal clot seen within the mid subclavian vein.    ALLERGIES:  No Known Allergies    REVIEW OF SYSTEMS:  General:  No wt loss, fevers, chills, night sweats  Eyes:  Good vision, no reported pain  ENT:  No sore throat, pain, runny nose, dysphagia  CV:  No pain, palpitations, hypo/hypertension  Resp:  No dyspnea, cough, tachypnea, wheezing  GI:  No pain, nausea, vomiting, diarrhea, constipation  :  No pain, bleeding, incontinence, nocturia  Muscle:  No pain, weakness  Breast:  No pain, abscess, mass, discharge  Neuro:  No weakness, tingling, memory problems  Psych:  No fatigue, insomnia, mood problems, depression  Endocrine:  No polyuria, polydipsia, cold/heat intolerance  Heme:  No petechiae, ecchymosis, easy bruisability  Skin:  No rash, arm edema L>R    PHYSICAL EXAM:  Vital Signs Last 24 Hrs  T(C): 36.3 (23 Dec 2018 12:31), Max: 37.1 (23 Dec 2018 00:00)  T(F): 97.4 (23 Dec 2018 12:31), Max: 98.7 (23 Dec 2018 00:00)  HR: 85 (23 Dec 2018 12:31) (56 - 85)  BP: 106/61 (23 Dec 2018 12:31) (106/61 - 184/86)  BP(mean): 91 (22 Dec 2018 18:09) (91 - 111)  RR: 17 (23 Dec 2018 12:31) (14 - 19)  SpO2: 99% (23 Dec 2018 12:31) (96% - 100%)    Tele: SR  General:  Appears stated age, well-groomed, well-nourished, no distress  HEENT:  NC/AT, patent nares w/ pink mucosa, OP clear w/o lesions, EOMI, conjunctivae clear, no thyromegaly, nodules, adenopathy, no JVD  Chest:  Full & symmetric excursion, no increased effort, breath sounds clear  Cardiovascular:  Regular rhythm, S1, S2, no murmur  Abdomen:  Soft, non-tender, non-distended, normoactive bowel sounds  Extremities:  mild left forearm swelling.  Skin:  skin is warm/dry  Musculoskeletal:  Full ROM in all joints w/o swelling/tenderness/effusion    LABORATORY:                          10.3   9.49  )-----------( 135      ( 23 Dec 2018 07:55 )             32.9     12-23    144  |  111<H>  |  18  ----------------------------<  108<H>  3.6   |  27  |  0.80    Ca    8.0<L>      23 Dec 2018 07:55  Phos  1.6     12-23  Mg     2.3     12-23      IMAGING:  ECG: SR, LVH    < from: Xray Chest 1 View AP/PA. (12.21.18 @ 07:36) >  No acute airspace disease or consolidation suggested. Support devices in   situ.    < end of copied text >    < from: TTE Echo Doppler w/o Cont (12.21.18 @ 17:20) >  Summary:   1. Left ventricular ejection fraction, by visual estimation, is 60 to   65%.   2. Technically fair study.   3. Normal global left ventricular systolic function.   4. Normal left ventricular internal cavity size.   5. Spectral Doppler shows impaired relaxation pattern of left   ventricular myocardial filling (Grade I diastolic dysfunction).   6. There is mild concentric left ventricular hypertrophy.   7. Normal right ventricular size and function.   8. There is no evidence of pericardial effusion.   9. Mild mitral annular calcification.  10. Mild mitral valve regurgitation.  11. Thickening and calcification of the anterior and posterior mitral   valve leaflets.  12. Mild tricuspid regurgitation.  13. Mild aortic regurgitation.  14. Sclerotic aortic valve with normal opening.  15. Pulmonic valve regurgitation.  16. The aortic valve mean gradient is 4.6 mmHg consistent with normally   opening aortic valve.  17. There is mild aortic root calcification.    < end of copied text >    < from: US Duplex Venous Upper Ext Ltd, Left (12.23.18 @ 10:58) >    Soft tissue swelling.    Positive deep vein thrombus within proximal left arm basilic veins and   nonocclusive focal clot seen within the mid subclavian vein.    Above discussed with NP Sandra Erazo on 12/23/2018 at 11:45 AM.    < end of copied text >    < from: CT Abdomen and Pelvis w/ Oral Cont and w/ IV Cont (12.23.18 @ 11:09) >  1. Mild right pleural effusion.   2. Consolidation in both lower lobes may represent atelectasis or   pneumonia..   3. 18 mm nodule upper pole right kidney 25 Hounsfield units. Recommend   surgery.  4. Air in the bladder may reflect recent instrumentation or infection.    < end of copied text >    ASSESSMENT:  63 years old male by ems s/p intubated c/o respiratory arrest. pt was in a restaurant c/o sob and ems noted pt has a neb pump next to him pt was sob, unable to talk. He had asthma attack one week ago was discharge from hospital with steroids  Recent history of PPM/AICD implantation at [facility]. CT head negative for acute infarct or bleed. CTA done  No CT evidence of pulmonary embolism.    Positive deep vein thrombus within proximal left arm basilic veins and   nonocclusive focal clot seen within the mid subclavian vein.    PLAN:     To continue with:  ALBUTerol    90 MICROgram(s) HFA Inhaler 1 Puff(s) Inhalation every 4 hours  ampicillin/sulbactam  IVPB 3 Gram(s) IV Intermittent every 6 hours  aspirin enteric coated 81 milliGRAM(s) Oral daily  atorvastatin 20 milliGRAM(s) Oral at bedtime  carvedilol 12.5 milliGRAM(s) Oral every 12 hours  chlorhexidine 4% Liquid 1 Application(s) Topical <User Schedule>  enoxaparin Injectable 80 milliGRAM(s) SubCutaneous every 12 hours  folic acid 1 milliGRAM(s) Oral daily  furosemide    Tablet 20 milliGRAM(s) Oral daily  influenza   Vaccine 0.5 milliLiter(s) IntraMuscular once  insulin glargine Injectable (LANTUS) 15 Unit(s) SubCutaneous at bedtime  insulin lispro (HumaLOG) corrective regimen sliding scale   SubCutaneous Before meals and at bedtime  insulin lispro Injectable (HumaLOG) 4 Unit(s) SubCutaneous before breakfast  insulin lispro Injectable (HumaLOG) 4 Unit(s) SubCutaneous before lunch  insulin lispro Injectable (HumaLOG) 4 Unit(s) SubCutaneous before dinner  lisinopril 20 milliGRAM(s) Oral daily  thiamine 100 milliGRAM(s) Oral daily  tiotropium 18 MICROgram(s) Capsule 1 Capsule(s) Inhalation daily    Probably to start full AC.    Gutierrez Stovall MD, FACC, FASRAY, FASNC, FACP  Director, Heart Failure Services  [facility]  , Department of Cardiology  [facility] HPI:  63 years old male by ems s/p intubated c/o respiratory arrest. pt was in a restaurant c/o sob and ems noted pt has a neb pump next to him pt was sob, unable to talk. He had asthma attack one week ago was discharge from hospital with steroids  Recent history of PPM/AICD implantation at [facility]. CT head negative for acute infarct or bleed. CTA done  No CT evidence of pulmonary embolism.    Positive deep vein thrombus within proximal left arm basilic veins and   nonocclusive focal clot seen within the mid subclavian vein.    ALLERGIES:  No Known Allergies    REVIEW OF SYSTEMS:  General:  No wt loss, fevers, chills, night sweats  Eyes:  Good vision, no reported pain  ENT:  No sore throat, pain, runny nose, dysphagia  CV:  No pain, palpitations, hypo/hypertension  Resp:  No dyspnea, cough, tachypnea, wheezing  GI:  No pain, nausea, vomiting, diarrhea, constipation  :  No pain, bleeding, incontinence, nocturia  Muscle:  No pain, weakness  Breast:  No pain, abscess, mass, discharge  Neuro:  No weakness, tingling, memory problems  Psych:  No fatigue, insomnia, mood problems, depression  Endocrine:  No polyuria, polydipsia, cold/heat intolerance  Heme:  No petechiae, ecchymosis, easy bruisability  Skin:  No rash, arm edema L>R    PHYSICAL EXAM:  Vital Signs Last 24 Hrs  T(C): 36.3 (23 Dec 2018 12:31), Max: 37.1 (23 Dec 2018 00:00)  T(F): 97.4 (23 Dec 2018 12:31), Max: 98.7 (23 Dec 2018 00:00)  HR: 85 (23 Dec 2018 12:31) (56 - 85)  BP: 106/61 (23 Dec 2018 12:31) (106/61 - 184/86)  BP(mean): 91 (22 Dec 2018 18:09) (91 - 111)  RR: 17 (23 Dec 2018 12:31) (14 - 19)  SpO2: 99% (23 Dec 2018 12:31) (96% - 100%)    Tele: SR  General:  Appears stated age, well-groomed, well-nourished, no distress  HEENT:  NC/AT, patent nares w/ pink mucosa, OP clear w/o lesions, EOMI, conjunctivae clear, no thyromegaly, nodules, adenopathy, no JVD  Chest:  Full & symmetric excursion, no increased effort, breath sounds clear  Cardiovascular:  Regular rhythm, S1, S2, no murmur  Abdomen:  Soft, non-tender, non-distended, normoactive bowel sounds  Extremities:  mild left forearm swelling.  Skin:  skin is warm/dry  Musculoskeletal:  Full ROM in all joints w/o swelling/tenderness/effusion    LABORATORY:                          10.3   9.49  )-----------( 135      ( 23 Dec 2018 07:55 )             32.9     12-23    144  |  111<H>  |  18  ----------------------------<  108<H>  3.6   |  27  |  0.80    Ca    8.0<L>      23 Dec 2018 07:55  Phos  1.6     12-23  Mg     2.3     12-23      IMAGING:  ECG: SR, LVH    < from: Xray Chest 1 View AP/PA. (12.21.18 @ 07:36) >  No acute airspace disease or consolidation suggested. Support devices in   situ.    < end of copied text >    < from: TTE Echo Doppler w/o Cont (12.21.18 @ 17:20) >  Summary:   1. Left ventricular ejection fraction, by visual estimation, is 60 to   65%.   2. Technically fair study.   3. Normal global left ventricular systolic function.   4. Normal left ventricular internal cavity size.   5. Spectral Doppler shows impaired relaxation pattern of left   ventricular myocardial filling (Grade I diastolic dysfunction).   6. There is mild concentric left ventricular hypertrophy.   7. Normal right ventricular size and function.   8. There is no evidence of pericardial effusion.   9. Mild mitral annular calcification.  10. Mild mitral valve regurgitation.  11. Thickening and calcification of the anterior and posterior mitral   valve leaflets.  12. Mild tricuspid regurgitation.  13. Mild aortic regurgitation.  14. Sclerotic aortic valve with normal opening.  15. Pulmonic valve regurgitation.  16. The aortic valve mean gradient is 4.6 mmHg consistent with normally   opening aortic valve.  17. There is mild aortic root calcification.    < end of copied text >    < from: US Duplex Venous Upper Ext Ltd, Left (12.23.18 @ 10:58) >    Soft tissue swelling.    Positive deep vein thrombus within proximal left arm basilic veins and   nonocclusive focal clot seen within the mid subclavian vein.    Above discussed with NP Sandra Erazo on 12/23/2018 at 11:45 AM.    < end of copied text >    < from: CT Abdomen and Pelvis w/ Oral Cont and w/ IV Cont (12.23.18 @ 11:09) >  1. Mild right pleural effusion.   2. Consolidation in both lower lobes may represent atelectasis or   pneumonia..   3. 18 mm nodule upper pole right kidney 25 Hounsfield units. Recommend   surgery.  4. Air in the bladder may reflect recent instrumentation or infection.    < end of copied text >    ASSESSMENT:  63 years old male by ems s/p intubated c/o respiratory arrest. pt was in a restaurant c/o sob and ems noted pt has a neb pump next to him pt was sob, unable to talk. He had asthma attack one week ago was discharge from hospital with steroids  Recent history of PPM/AICD implantation at [facility]. CT head negative for acute infarct or bleed. CTA done  No CT evidence of pulmonary embolism.    Positive deep vein thrombus within proximal left arm basilic veins and   nonocclusive focal clot seen within the mid subclavian vein.    PLAN:     To continue with:  ALBUTerol    90 MICROgram(s) HFA Inhaler 1 Puff(s) Inhalation every 4 hours  ampicillin/sulbactam  IVPB 3 Gram(s) IV Intermittent every 6 hours  aspirin enteric coated 81 milliGRAM(s) Oral daily  atorvastatin 20 milliGRAM(s) Oral at bedtime  carvedilol 12.5 milliGRAM(s) Oral every 12 hours  chlorhexidine 4% Liquid 1 Application(s) Topical <User Schedule>  enoxaparin Injectable 80 milliGRAM(s) SubCutaneous every 12 hours  folic acid 1 milliGRAM(s) Oral daily  furosemide    Tablet 20 milliGRAM(s) Oral daily  influenza   Vaccine 0.5 milliLiter(s) IntraMuscular once  insulin glargine Injectable (LANTUS) 15 Unit(s) SubCutaneous at bedtime  insulin lispro (HumaLOG) corrective regimen sliding scale   SubCutaneous Before meals and at bedtime  insulin lispro Injectable (HumaLOG) 4 Unit(s) SubCutaneous before breakfast  insulin lispro Injectable (HumaLOG) 4 Unit(s) SubCutaneous before lunch  insulin lispro Injectable (HumaLOG) 4 Unit(s) SubCutaneous before dinner  lisinopril 20 milliGRAM(s) Oral daily  thiamine 100 milliGRAM(s) Oral daily  tiotropium 18 MICROgram(s) Capsule 1 Capsule(s) Inhalation daily    Try to keep more fluid out than in.    Gutierrez Stvoall MD, FACC, FASE, FASNC, FACP  Director, Heart Failure Services  [facility]  , Department of Cardiology  [facility]

## 2018-12-23 NOTE — PROGRESS NOTE ADULT - ASSESSMENT
62 y/o  male PMH Asthma? DM. CAD sp PPM/AICD, CVA,  admitted with respiratory distress requiring intubation in the field by EMS, unclear etiology, possible 2/2 asthma exacerbation.  CTA negative for PE or acute airspace disease. CT head with old infarcts.    -S/P extubation yesterday, doing well  -cont on due nebs, taper steroids  -Pulm fup on floors and outpt.  -BC sent from ER with GPCS and GNR. acinetobacter, staph coag neg contamination? fup with ID  -repeat blood c/s, cont zosyn, fup c/s  -insulin sliding scale and fingersticks,   -resume home meds  -check A1C, diabetic teaching, ADA diet.       CC time: 40 min 62 y/o  male PMH Asthma? DM. CAD sp PPM/AICD, CVA,  admitted with respiratory distress requiring intubation in the field by EMS, unclear etiology, possible 2/2 asthma exacerbation.  CTA negative for PE or acute airspace disease. CT head with old infarcts.

## 2018-12-23 NOTE — PROGRESS NOTE ADULT - PROBLEM SELECTOR PLAN 4
cont w/ lantus and prandial humalog  FS monitoring with insulin s/s coverage. Resolved with IVF  Hypophosphatemia: will replace.

## 2018-12-23 NOTE — PROGRESS NOTE ADULT - PROBLEM SELECTOR PROBLEM 4
Preventive measure Type 2 diabetes mellitus with complication, with long-term current use of insulin Acute renal failure, unspecified acute renal failure type

## 2018-12-23 NOTE — PROGRESS NOTE ADULT - SUBJECTIVE AND OBJECTIVE BOX
INTERVAL HPI:  63 years old male by ems s/p et intubated c/o respiratory arrest. EMS sts pt was in a restaurant c/o sob and ems noted pt has a neb pump next to him pt was sob, unable to talk. Pt here is et intubated and breath sounds + expiratory wheezing bilaterally. Pt's wife is here at bed side now states pt had asthma attack one week ago was discharge from hospital with steroids  Recent history of PPM/AICD implantation at [facility] as per family. Patient seen and evaluated at bedside with ICU attending. CT head negative for acute infarct or bleed. CTA done  No CT evidence of pulmonary embolism. full report in chart. (2  Admitted to ICU with Respiratory failure .  12/21/18: extubated.  12/21/18:  Says he was having cough , runny nose and scratchy throat few days prior to his current attack.  Smoked 35-40 years, quit 6 years ago, has Asthma for 20 years.  12/22/18: out of ICU.    OVERNIGHT EVENTS:  Feels better.    Vital Signs Last 24 Hrs  T(C): 36.4 (23 Dec 2018 17:30), Max: 37.1 (23 Dec 2018 00:00)  T(F): 97.5 (23 Dec 2018 17:30), Max: 98.7 (23 Dec 2018 00:00)  HR: 70 (23 Dec 2018 17:30) (56 - 85)  BP: 135/75 (23 Dec 2018 17:30) (106/61 - 169/73)  BP(mean): --  RR: 17 (23 Dec 2018 17:30) (17 - 18)  SpO2: 97% (23 Dec 2018 17:30) (96% - 99%)    PHYSICAL EXAM:  GEN:         Awake, responsive and comfortable.  HEENT:    Normal.    RESP:       no wheezing.  CVS:          Regular rate and rhythm.   ABD:         Soft, non-tender, non-distended;   :             No costovertebral angle tenderness  EXTR:            No clubbing, cyanosis or edema  CNS:              Intact sensory and motor function.    MEDICATIONS  (STANDING):  ALBUTerol    90 MICROgram(s) HFA Inhaler 1 Puff(s) Inhalation every 4 hours  ampicillin/sulbactam  IVPB 3 Gram(s) IV Intermittent every 6 hours  aspirin enteric coated 81 milliGRAM(s) Oral daily  atorvastatin 20 milliGRAM(s) Oral at bedtime  carvedilol 12.5 milliGRAM(s) Oral every 12 hours  chlorhexidine 4% Liquid 1 Application(s) Topical <User Schedule>  dextrose 5%. 1000 milliLiter(s) (50 mL/Hr) IV Continuous <Continuous>  dextrose 50% Injectable 12.5 Gram(s) IV Push once  dextrose 50% Injectable 25 Gram(s) IV Push once  dextrose 50% Injectable 25 Gram(s) IV Push once  enoxaparin Injectable 80 milliGRAM(s) SubCutaneous every 12 hours  folic acid 1 milliGRAM(s) Oral daily  furosemide    Tablet 20 milliGRAM(s) Oral daily  influenza   Vaccine 0.5 milliLiter(s) IntraMuscular once  insulin glargine Injectable (LANTUS) 15 Unit(s) SubCutaneous at bedtime  insulin lispro (HumaLOG) corrective regimen sliding scale   SubCutaneous Before meals and at bedtime  insulin lispro Injectable (HumaLOG) 4 Unit(s) SubCutaneous before breakfast  insulin lispro Injectable (HumaLOG) 4 Unit(s) SubCutaneous before lunch  insulin lispro Injectable (HumaLOG) 4 Unit(s) SubCutaneous before dinner  lisinopril 20 milliGRAM(s) Oral daily  thiamine 100 milliGRAM(s) Oral daily  tiotropium 18 MICROgram(s) Capsule 1 Capsule(s) Inhalation daily    MEDICATIONS  (PRN):  dextrose 40% Gel 15 Gram(s) Oral once PRN Blood Glucose LESS THAN 70 milliGRAM(s)/deciliter  glucagon  Injectable 1 milliGRAM(s) IntraMuscular once PRN Glucose LESS THAN 70 milligrams/deciliter    LABS:                        10.3   9.49  )-----------( 135      ( 23 Dec 2018 07:55 )             32.9     12-23    144  |  111<H>  |  18  ----------------------------<  108<H>  3.6   |  27  |  0.80    Ca    8.0<L>      23 Dec 2018 07:55  Phos  1.6     12-23  Mg     2.3     12-23    PT/INR - ( 22 Dec 2018 04:03 )   PT: 14.6 sec;   INR: 1.29 ratio      12-21 @ 08:20  pH: 7.48  pCO2: 31  pO2: 127  SaO2: 100  12-20 @ 21:37  pH: 7.32  pCO2: 46  pO2: 134  SaO2: 99  12-20 @ 14:45  pH: 7.26  pCO2: 54  pO2: 388  SaO2: 100    RADIOLOGY & ADDITIONAL STUDIES:  < from: CT Abdomen and Pelvis w/ Oral Cont and w/ IV Cont (12.23.18 @ 11:09) >    EXAM:  CT ABDOMEN AND PELVIS OC IC                          PROCEDURE DATE:  12/23/2018      INTERPRETATION:  [facility] RADIOLOGIST PRELIMINARY REPORT    EXAM:    CT Abdomen and Pelvis With Contrast     EXAM DATE/TIME:    12/23/2018 10:57 AM     CLINICAL HISTORY:    63 years old, male; Signs and symptoms; Other: Polymicrobial bacteremia     TECHNIQUE:    Axial computed tomography images of the abdomen and pelvis with   intravenous   contrast.    All CT scans at this facility use at least one of these dose   optimization   techniques: automated exposure control; mA and/or kV adjustment per   patient   size (includes targeted exams where dose is matched to clinical   indication); or   iterative reconstruction.    Coronal and sagittal reformatted images were created and reviewed.     COMPARISON:    No relevant prior studies available.     FINDINGS:    Tubes, catheters and devices:  Transvenous pacemaker leads in the right   heart    Lower thorax:  Mild right pleural effusion. Consolidation in both lower   lobes   may represent atelectasis or pneumonia..     ABDOMEN:    Liver: Normal. No mass.    Gallbladder and bile ducts: Normal. No calcified stones. No ductal   dilation.    Pancreas:  Dilatation of the pancreatic duct    Spleen: Normal. No splenomegaly.    Adrenals: Normal. No mass.    Kidneys and ureters:  Multiple bilateral renal cysts. Largest right   renal cyst   4 cm. Largest left renal cyst 4.2 cm. 18 mm nodule upper pole right   kidney 25   Hounsfield units.    Stomach and bowel: Normal. No obstruction. No mucosal thickening.    Appendix:  Normal appendix     PELVIS:    Bladder:  Air in the bladder may reflect recent instrumentation or   infection.    Reproductive: Unremarkable as visualized.     ABDOMEN and PELVIS:    Intraperitoneal space: Normal. No free air. No significant fluid   collection.    Bones/joints: No acute fracture. No dislocation.    Soft tissues:  Umbilical hernia contains fat    Vasculature: Normal. No abdominal aortic aneurysm.    Lymph nodes: Normal. No enlarged lymph nodes.     IMPRESSION:   1. Mild right pleural effusion.   2. Consolidation in both lower lobes may represent atelectasis or   pneumonia..   3. 18 mm nodule upper pole right kidney 25 Hounsfield units. Recommend   surgery.  4. Air in the bladder may reflect recent instrumentation or infection.    Addendum:      Small bilateral pleural effusions, larger on the right with mild   overlying atelectasis.    There are multiple renal cysts with largest cyst measuring up to   approximately 4 cm in each kidney. I contacted vrad radiologist Marcy Harris. The 18 mm nodule upper pole right kidney that she is referring to   is the most superior lesion in the right kidney. This has the appearance   of a cyst and surgery is not recommended.    In addition, trace pericardial effusion is seen.    There is mild fatty infiltration of the liver.    Nonspecific trace fluid seen right upper abdomen and trace fluid right   lower quadrant at the unremarkable appearing appendix.    Small amount of air is noted in the bilateral anterior abdominal wall   soft tissues presumably iatrogenic, i.e. related to injection.    Degenerative changes L4-5. Small nonspecific lucency seen in the L2   vertebral body.    Other findings as described in the vrad report.    LEONEL LANG M.D. ATTENDING RADIOLOGIST  This document has been electronically signed. Dec 23 2018  3:02PM    < from: US Duplex Venous Upper Ext Ltd, Left (12.23.18 @ 10:58) >    EXAM:  US DPLX [facility] EXT VEINS LTD LT                          PROCEDURE DATE:  12/23/2018      INTERPRETATION:  History: Left arm swelling.    Findings: Duplex Doppler examination of the left upper extremity to the   most proximal imageable portion of the left subclavian vein as well as   evaluation of the left internal jugular vein was performed using a 9 MHz   linear transducer.    There is soft tissue swelling noted.    In the proximal left arm, there are two veins seen flanking the brachial   artery, one of which is seen filled with echogenic material with minimal   peripheral flow noted compatible with deep vein thrombus. This is felt to   represent a deep basilic vein as it communicates with another proximal   basilic vein which demonstrates thrombus narrowing its lumen. The deep   veins in the mid through distal arm are patent. The axillary vein is   patent. There is nonocclusive focal clot seen within the mid subclavian   vein. The remainder of the visualized subclavian vein is patent. The   visualized right internal jugular vein is patent. The cephalic vein in   the arm is thrombosed.    Impression:    Soft tissue swelling.    Positive deep vein thrombus within proximal left arm basilic veins and   nonocclusive focal clot seen within the mid subclavian vein.    Above discussed with CHRIS Erazo on 12/23/2018 at 11:45 AM.    LEONEL LANG M.D. ATTENDING RADIOLOGIST  This document has been electronically signed. Dec 23 2018 11:44AM          ASSESSMENT AND PLAN:  ·	S/P Hypoxic Respiratory failure.  ·	Acute COPD exacerbation.  ·	Atelectasis/Pneumonia.  ·	Acinetobacter in blood.  ·	Left upper extremity DVT.  ·	Anemia.  ·	CAD.  ·	S/P PPM  ·	HTN.  ·	DM  ·	CVA history.    Will add Symbicort and Incentive spirometry.  On antibiotics, nebulizer and diuretics.

## 2018-12-24 DIAGNOSIS — I50.32 CHRONIC DIASTOLIC (CONGESTIVE) HEART FAILURE: ICD-10-CM

## 2018-12-24 DIAGNOSIS — J45.901 UNSPECIFIED ASTHMA WITH (ACUTE) EXACERBATION: ICD-10-CM

## 2018-12-24 LAB
ANION GAP SERPL CALC-SCNC: 6 MMOL/L — SIGNIFICANT CHANGE UP (ref 5–17)
BUN SERPL-MCNC: 17 MG/DL — SIGNIFICANT CHANGE UP (ref 7–23)
CALCIUM SERPL-MCNC: 8.1 MG/DL — LOW (ref 8.5–10.1)
CHLORIDE SERPL-SCNC: 109 MMOL/L — HIGH (ref 96–108)
CO2 SERPL-SCNC: 26 MMOL/L — SIGNIFICANT CHANGE UP (ref 22–31)
CREAT SERPL-MCNC: 0.85 MG/DL — SIGNIFICANT CHANGE UP (ref 0.5–1.3)
GLUCOSE BLDC GLUCOMTR-MCNC: 196 MG/DL — HIGH (ref 70–99)
GLUCOSE BLDC GLUCOMTR-MCNC: 200 MG/DL — HIGH (ref 70–99)
GLUCOSE BLDC GLUCOMTR-MCNC: 218 MG/DL — HIGH (ref 70–99)
GLUCOSE BLDC GLUCOMTR-MCNC: 222 MG/DL — HIGH (ref 70–99)
GLUCOSE SERPL-MCNC: 189 MG/DL — HIGH (ref 70–99)
HCT VFR BLD CALC: 32.8 % — LOW (ref 39–50)
HGB BLD-MCNC: 10.5 G/DL — LOW (ref 13–17)
INR BLD: 1.18 RATIO — HIGH (ref 0.88–1.16)
MCHC RBC-ENTMCNC: 28.8 PG — SIGNIFICANT CHANGE UP (ref 27–34)
MCHC RBC-ENTMCNC: 32 GM/DL — SIGNIFICANT CHANGE UP (ref 32–36)
MCV RBC AUTO: 89.9 FL — SIGNIFICANT CHANGE UP (ref 80–100)
NRBC # BLD: 0 /100 WBCS — SIGNIFICANT CHANGE UP (ref 0–0)
PHOSPHATE SERPL-MCNC: 2.1 MG/DL — LOW (ref 2.5–4.5)
PLATELET # BLD AUTO: 136 K/UL — LOW (ref 150–400)
POTASSIUM SERPL-MCNC: 3.7 MMOL/L — SIGNIFICANT CHANGE UP (ref 3.5–5.3)
POTASSIUM SERPL-SCNC: 3.7 MMOL/L — SIGNIFICANT CHANGE UP (ref 3.5–5.3)
PROTHROM AB SERPL-ACNC: 13.3 SEC — HIGH (ref 10–12.9)
RBC # BLD: 3.65 M/UL — LOW (ref 4.2–5.8)
RBC # FLD: 14.6 % — HIGH (ref 10.3–14.5)
SODIUM SERPL-SCNC: 141 MMOL/L — SIGNIFICANT CHANGE UP (ref 135–145)
WBC # BLD: 8.91 K/UL — SIGNIFICANT CHANGE UP (ref 3.8–10.5)
WBC # FLD AUTO: 8.91 K/UL — SIGNIFICANT CHANGE UP (ref 3.8–10.5)

## 2018-12-24 PROCEDURE — 99233 SBSQ HOSP IP/OBS HIGH 50: CPT

## 2018-12-24 RX ORDER — RIVAROXABAN 15 MG-20MG
1 KIT ORAL
Qty: 1 | Refills: 0 | OUTPATIENT
Start: 2018-12-24 | End: 2019-01-22

## 2018-12-24 RX ORDER — WARFARIN SODIUM 2.5 MG/1
10 TABLET ORAL ONCE
Qty: 0 | Refills: 0 | Status: COMPLETED | OUTPATIENT
Start: 2018-12-24 | End: 2018-12-24

## 2018-12-24 RX ORDER — POTASSIUM PHOSPHATE, MONOBASIC POTASSIUM PHOSPHATE, DIBASIC 236; 224 MG/ML; MG/ML
15 INJECTION, SOLUTION INTRAVENOUS ONCE
Qty: 0 | Refills: 0 | Status: COMPLETED | OUTPATIENT
Start: 2018-12-24 | End: 2018-12-24

## 2018-12-24 RX ADMIN — Medication 100 MILLIGRAM(S): at 12:03

## 2018-12-24 RX ADMIN — Medication 81 MILLIGRAM(S): at 12:20

## 2018-12-24 RX ADMIN — LISINOPRIL 20 MILLIGRAM(S): 2.5 TABLET ORAL at 06:15

## 2018-12-24 RX ADMIN — Medication 4 UNIT(S): at 08:42

## 2018-12-24 RX ADMIN — Medication 30 MILLIGRAM(S): at 06:15

## 2018-12-24 RX ADMIN — BUDESONIDE AND FORMOTEROL FUMARATE DIHYDRATE 2 PUFF(S): 160; 4.5 AEROSOL RESPIRATORY (INHALATION) at 18:00

## 2018-12-24 RX ADMIN — CARVEDILOL PHOSPHATE 12.5 MILLIGRAM(S): 80 CAPSULE, EXTENDED RELEASE ORAL at 18:05

## 2018-12-24 RX ADMIN — Medication 4 UNIT(S): at 12:04

## 2018-12-24 RX ADMIN — Medication 1 MILLIGRAM(S): at 12:20

## 2018-12-24 RX ADMIN — TIOTROPIUM BROMIDE 1 CAPSULE(S): 18 CAPSULE ORAL; RESPIRATORY (INHALATION) at 12:04

## 2018-12-24 RX ADMIN — Medication 2: at 08:42

## 2018-12-24 RX ADMIN — ENOXAPARIN SODIUM 80 MILLIGRAM(S): 100 INJECTION SUBCUTANEOUS at 18:05

## 2018-12-24 RX ADMIN — AMPICILLIN SODIUM AND SULBACTAM SODIUM 200 GRAM(S): 250; 125 INJECTION, POWDER, FOR SUSPENSION INTRAMUSCULAR; INTRAVENOUS at 18:04

## 2018-12-24 RX ADMIN — Medication 4: at 16:54

## 2018-12-24 RX ADMIN — ALBUTEROL 1 PUFF(S): 90 AEROSOL, METERED ORAL at 18:12

## 2018-12-24 RX ADMIN — AMPICILLIN SODIUM AND SULBACTAM SODIUM 200 GRAM(S): 250; 125 INJECTION, POWDER, FOR SUSPENSION INTRAMUSCULAR; INTRAVENOUS at 00:11

## 2018-12-24 RX ADMIN — Medication 20 MILLIGRAM(S): at 06:15

## 2018-12-24 RX ADMIN — ALBUTEROL 1 PUFF(S): 90 AEROSOL, METERED ORAL at 14:24

## 2018-12-24 RX ADMIN — BUDESONIDE AND FORMOTEROL FUMARATE DIHYDRATE 2 PUFF(S): 160; 4.5 AEROSOL RESPIRATORY (INHALATION) at 06:16

## 2018-12-24 RX ADMIN — Medication 4: at 22:06

## 2018-12-24 RX ADMIN — ATORVASTATIN CALCIUM 20 MILLIGRAM(S): 80 TABLET, FILM COATED ORAL at 22:06

## 2018-12-24 RX ADMIN — ALBUTEROL 1 PUFF(S): 90 AEROSOL, METERED ORAL at 06:17

## 2018-12-24 RX ADMIN — POTASSIUM PHOSPHATE, MONOBASIC POTASSIUM PHOSPHATE, DIBASIC 63.75 MILLIMOLE(S): 236; 224 INJECTION, SOLUTION INTRAVENOUS at 10:00

## 2018-12-24 RX ADMIN — ALBUTEROL 1 PUFF(S): 90 AEROSOL, METERED ORAL at 10:22

## 2018-12-24 RX ADMIN — INSULIN GLARGINE 15 UNIT(S): 100 INJECTION, SOLUTION SUBCUTANEOUS at 22:06

## 2018-12-24 RX ADMIN — Medication 4 UNIT(S): at 16:54

## 2018-12-24 RX ADMIN — AMPICILLIN SODIUM AND SULBACTAM SODIUM 200 GRAM(S): 250; 125 INJECTION, POWDER, FOR SUSPENSION INTRAMUSCULAR; INTRAVENOUS at 12:20

## 2018-12-24 RX ADMIN — ENOXAPARIN SODIUM 80 MILLIGRAM(S): 100 INJECTION SUBCUTANEOUS at 06:15

## 2018-12-24 RX ADMIN — AMPICILLIN SODIUM AND SULBACTAM SODIUM 200 GRAM(S): 250; 125 INJECTION, POWDER, FOR SUSPENSION INTRAMUSCULAR; INTRAVENOUS at 06:15

## 2018-12-24 RX ADMIN — Medication 2: at 12:03

## 2018-12-24 NOTE — PROGRESS NOTE ADULT - ASSESSMENT
S/P Hypoxic Respiratory failure.  Acute COPD exacerbation.  Atelectasis/Pneumonia.    benzos in system ?? from ??   blood culture ; single set anerobic cns;; aerobic e faecium and acinetobacter all sensitive to ampicillin   likely contaminants , unclear source   CT abd noted no impressive infection , clinically ok  incidental renal cysts noted .     cardiac follow up ?? ac resp failure from cardiac issues   i will give Augmentin 500 q 8 hr for 2 weeks from 12/21, when first culture negative. END DATE 1/4/2019.   THANKS

## 2018-12-24 NOTE — PROGRESS NOTE ADULT - SUBJECTIVE AND OBJECTIVE BOX
INTERVAL HPI:  63 years old male by ems s/p et intubated c/o respiratory arrest. EMS sts pt was in a restaurant c/o sob and ems noted pt has a neb pump next to him pt was sob, unable to talk. Pt here is et intubated and breath sounds + expiratory wheezing bilaterally. Pt's wife is here at bed side now states pt had asthma attack one week ago was discharge from hospital with steroids  Recent history of PPM/AICD implantation at [facility] as per family. Patient seen and evaluated at bedside with ICU attending. CT head negative for acute infarct or bleed. CTA done  No CT evidence of pulmonary embolism. full report in chart. (2  Admitted to ICU with Respiratory failure .  12/21/18: extubated.  12/21/18:  Says he was having cough , runny nose and scratchy throat few days prior to his current attack.  Smoked 35-40 years, quit 6 years ago, has Asthma for 20 years.  12/22/18: out of ICU.    OVERNIGHT EVENTS:  Awake, comfortable, feels better.    Vital Signs Last 24 Hrs  T(C): 36.4 (24 Dec 2018 05:06), Max: 36.6 (24 Dec 2018 00:08)  T(F): 97.6 (24 Dec 2018 05:06), Max: 97.8 (24 Dec 2018 00:08)  HR: 52 (24 Dec 2018 05:06) (52 - 85)  BP: 162/87 (24 Dec 2018 05:06) (106/61 - 162/87)  BP(mean): --  RR: 16 (24 Dec 2018 05:06) (16 - 18)  SpO2: 99% (24 Dec 2018 05:06) (97% - 100%)    PHYSICAL EXAM:  GEN:         Awake, responsive and comfortable.  HEENT:    Normal.    RESP:       no wheezing.  CVS:          Regular rate and rhythm.   ABD:         Soft, non-tender, non-distended;     MEDICATIONS  (STANDING):  ALBUTerol    90 MICROgram(s) HFA Inhaler 1 Puff(s) Inhalation every 4 hours  ampicillin/sulbactam  IVPB 3 Gram(s) IV Intermittent every 6 hours  aspirin enteric coated 81 milliGRAM(s) Oral daily  atorvastatin 20 milliGRAM(s) Oral at bedtime  buDESOnide 160 MICROgram(s)/formoterol 4.5 MICROgram(s) Inhaler 2 Puff(s) Inhalation two times a day  carvedilol 12.5 milliGRAM(s) Oral every 12 hours  chlorhexidine 4% Liquid 1 Application(s) Topical <User Schedule>  dextrose 5%. 1000 milliLiter(s) (50 mL/Hr) IV Continuous <Continuous>  dextrose 50% Injectable 12.5 Gram(s) IV Push once  dextrose 50% Injectable 25 Gram(s) IV Push once  dextrose 50% Injectable 25 Gram(s) IV Push once  enoxaparin Injectable 80 milliGRAM(s) SubCutaneous every 12 hours  folic acid 1 milliGRAM(s) Oral daily  furosemide    Tablet 20 milliGRAM(s) Oral daily  influenza   Vaccine 0.5 milliLiter(s) IntraMuscular once  insulin glargine Injectable (LANTUS) 15 Unit(s) SubCutaneous at bedtime  insulin lispro (HumaLOG) corrective regimen sliding scale   SubCutaneous Before meals and at bedtime  insulin lispro Injectable (HumaLOG) 4 Unit(s) SubCutaneous before breakfast  insulin lispro Injectable (HumaLOG) 4 Unit(s) SubCutaneous before lunch  insulin lispro Injectable (HumaLOG) 4 Unit(s) SubCutaneous before dinner  lisinopril 20 milliGRAM(s) Oral daily  predniSONE   Tablet 30 milliGRAM(s) Oral daily  thiamine 100 milliGRAM(s) Oral daily  tiotropium 18 MICROgram(s) Capsule 1 Capsule(s) Inhalation daily    MEDICATIONS  (PRN):  dextrose 40% Gel 15 Gram(s) Oral once PRN Blood Glucose LESS THAN 70 milliGRAM(s)/deciliter  glucagon  Injectable 1 milliGRAM(s) IntraMuscular once PRN Glucose LESS THAN 70 milligrams/deciliter    LABS:                        10.5   8.91  )-----------( 136      ( 24 Dec 2018 07:48 )             32.8     12-23    144  |  111<H>  |  18  ----------------------------<  108<H>  3.6   |  27  |  0.80    Ca    8.0<L>      23 Dec 2018 07:55  Phos  1.6     12-23  Mg     2.3     12-23 12-21 @ 08:20  pH: 7.48  pCO2: 31  pO2: 127  SaO2: 100  12-20 @ 21:37  pH: 7.32  pCO2: 46  pO2: 134  SaO2: 99  12-20 @ 14:45  pH: 7.26  pCO2: 54  pO2: 388  SaO2: 100      ASSESSMENT AND PLAN:  ·	S/P Hypoxic Respiratory failure.  ·	Acute COPD exacerbation.  ·	Atelectasis/Pneumonia.  ·	Acinetobacter in blood.  ·	Left upper extremity DVT.  ·	Anemia.  ·	CAD.  ·	S/P PPM  ·	HTN.  ·	DM  ·	CVA history.    Taper steroids over 5-7 days.  continue Symbicort and bronchodilators.

## 2018-12-24 NOTE — PROGRESS NOTE ADULT - ASSESSMENT
63 years old male by ems s/p intubation c/o respiratory arrest. He had asthma attack one week ago was discharge from hospital with steroids  Recent history of PPM/AICD implantation at [facility].   CT head negative for acute infarct or bleed.   CTA done  No CT evidence of pulmonary embolism.  Positive deep vein thrombus within proximal left arm basilic veins and   nonocclusive focal clot seen within the mid subclavian vein.  Patient was extubated on 12/21, being managed on medical floor now  blood culture: Growth in aerobic bottle: Gram Positive Cocci in Clusters   Echo: EF 60-65%, mild MR/AR/TR    Plan    - c/w inhalers/supplemental O2/steroid  -Abx for bacteremia  -AC for DVT, bridging with full dose Lovenox to coumadin   -c/e asa/statin  -c/w carvedilol/lisinopril/low dose Lasix  -Diabetic management   -keep negative balance, monitor electrolytes /renal function 63 years old male by ems s/p intubation c/o respiratory arrest. He had asthma attack one week ago was discharge from hospital with steroids.  Recent history of PPM implantation at [facility]. Chronic diastolic HF, CVA Hx, CAD hx  CT head negative for acute infarct or bleed.   CTA done  No CT evidence of pulmonary embolism.  Positive deep vein thrombus within proximal left arm basilic veins and   nonocclusive focal clot seen within the mid subclavian vein.  Patient was extubated on 12/21, being managed on medical floor now  blood culture: Growth in aerobic bottle: Gram Positive Cocci in Clusters   Echo: EF 60-65%, mild MR/AR/TR, grade I DD    Plan    - c/w inhalers/supplemental O2/steroid  -Abx for bacteremia  -AC for DVT, bridging with full dose Lovenox to coumadin   -c/e asa/statin  -c/w carvedilol/lisinopril/low dose Lasix  -Diabetic management   -keep negative balance, monitor electrolytes /renal function   -Increase activity/ PT

## 2018-12-24 NOTE — PROGRESS NOTE ADULT - ASSESSMENT
62 y/o  male PMH Asthma? DM. CAD sp PPM/AICD, CVA,  admitted with respiratory distress requiring intubation in the field by EMS, unclear etiology, possible 2/2 asthma exacerbation.  CTA negative for PE or acute airspace disease. CT head with old infarcts.

## 2018-12-24 NOTE — PROGRESS NOTE ADULT - SUBJECTIVE AND OBJECTIVE BOX
Patient is a 63y old  Male who presents with a chief complaint of respiratory failure (24 Dec 2018 10:10)      PAST MEDICAL & SURGICAL HISTORY:  Diabetes  HTN (hypertension)  CAD (coronary artery disease)  CVA (cerebral vascular accident)  Asthma  Cardiac pacemaker      INTERVAL HISTORY: Resting in bed, not in any cute distress   	  MEDICATIONS:  MEDICATIONS  (STANDING):  ALBUTerol    90 MICROgram(s) HFA Inhaler 1 Puff(s) Inhalation every 4 hours  ampicillin/sulbactam  IVPB 3 Gram(s) IV Intermittent every 6 hours  aspirin enteric coated 81 milliGRAM(s) Oral daily  atorvastatin 20 milliGRAM(s) Oral at bedtime  buDESOnide 160 MICROgram(s)/formoterol 4.5 MICROgram(s) Inhaler 2 Puff(s) Inhalation two times a day  carvedilol 12.5 milliGRAM(s) Oral every 12 hours  chlorhexidine 4% Liquid 1 Application(s) Topical <User Schedule>  dextrose 5%. 1000 milliLiter(s) (50 mL/Hr) IV Continuous <Continuous>  dextrose 50% Injectable 12.5 Gram(s) IV Push once  dextrose 50% Injectable 25 Gram(s) IV Push once  dextrose 50% Injectable 25 Gram(s) IV Push once  enoxaparin Injectable 80 milliGRAM(s) SubCutaneous every 12 hours  folic acid 1 milliGRAM(s) Oral daily  furosemide    Tablet 20 milliGRAM(s) Oral daily  influenza   Vaccine 0.5 milliLiter(s) IntraMuscular once  insulin glargine Injectable (LANTUS) 15 Unit(s) SubCutaneous at bedtime  insulin lispro (HumaLOG) corrective regimen sliding scale   SubCutaneous Before meals and at bedtime  insulin lispro Injectable (HumaLOG) 4 Unit(s) SubCutaneous before breakfast  insulin lispro Injectable (HumaLOG) 4 Unit(s) SubCutaneous before lunch  insulin lispro Injectable (HumaLOG) 4 Unit(s) SubCutaneous before dinner  lisinopril 20 milliGRAM(s) Oral daily  predniSONE   Tablet 30 milliGRAM(s) Oral daily  thiamine 100 milliGRAM(s) Oral daily  tiotropium 18 MICROgram(s) Capsule 1 Capsule(s) Inhalation daily    MEDICATIONS  (PRN):  dextrose 40% Gel 15 Gram(s) Oral once PRN Blood Glucose LESS THAN 70 milliGRAM(s)/deciliter  glucagon  Injectable 1 milliGRAM(s) IntraMuscular once PRN Glucose LESS THAN 70 milligrams/deciliter      Vitals:  T(F): 97.9 (12-24-18 @ 11:06), Max: 97.9 (12-24-18 @ 11:06)  HR: 66 (12-24-18 @ 11:06) (52 - 70)  BP: 142/83 (12-24-18 @ 11:06) (135/75 - 162/87)  RR: 17 (12-24-18 @ 11:06) (16 - 18)  SpO2: 99% (12-24-18 @ 11:06) (97% - 100%)    12-23 @ 07:01  -  12-24 @ 07:00  --------------------------------------------------------  IN:    Oral Fluid: 640 mL    Solution: 200 mL  Total IN: 840 mL    OUT:    Voided: 1050 mL  Total OUT: 1050 mL    Total NET: -210 mL      12-24 @ 07:01  -  12-24 @ 14:59  --------------------------------------------------------  IN:    Oral Fluid: 700 mL  Total IN: 700 mL    OUT:    Voided: 1050 mL  Total OUT: 1050 mL    Total NET: -350 mL    PHYSICAL EXAM:  Neuro: Awake, responsive  CV: S1 S2 RRR  Lungs: CTABL  GI: Soft, BS +, ND, NT  Extremities: No edema    RADIOLOGY:   < from: Xray Chest 1 View AP/PA. (12.21.18 @ 07:36) >  A frontal chest film again demonstrates the predominantly clear lungs   with mild prominence of interstitial markings..    Heart is mildly enlarged. Pacemaker in situ.      Support devices in situ. .     The osseous structures appear intact intact.     IMPRESSION:  No acute airspace disease or consolidation suggested. Support devices in   situ.    < end of copied text >  < from: US Duplex Venous Upper Ext Ltd, Left (12.23.18 @ 10:58) >    Soft tissue swelling.    Positive deep vein thrombus within proximal left arm basilic veins and   nonocclusive focal clot seen within the mid subclavian vein.    < end of copied text >   < from: CT Abdomen and Pelvis w/ Oral Cont and w/ IV Cont (12.23.18 @ 11:09) >  1. Mild right pleural effusion.   2. Consolidation in both lower lobes may represent atelectasis or   pneumonia..   3. 18 mm nodule upper pole right kidney 25 Hounsfield units. Recommend   surgery.  4. Air in the bladder may reflect recent instrumentation or infection.        Addendum:      Small bilateral pleural effusions, larger on the right with mild   overlying atelectasis.    There are multiple renal cysts with largest cyst measuring up to   approximately 4 cm in each kidney. I contacted ad radiologist Marcy Harris. The 18 mm nodule upper pole right kidney that she is referring to   is the most superior lesion in the right kidney. This has the appearance   of a cyst and surgery is not recommended.    In addition, trace pericardial effusion is seen.    There is mild fatty infiltration of the liver.    Nonspecific trace fluid seen right upper abdomen and trace fluid right   lower quadrant at the unremarkable appearing appendix.    Small amount of air is noted in the bilateral anterior abdominal wall   soft tissues presumably iatrogenic, i.e. related to injection.    Degenerative changes L4-5. Small nonspecific lucency seen in the L2   vertebral body.    < end of copied text >                                                                                                                                                                                                                                                                                                                         DIAGNOSTIC TESTING:    [x ] Echocardiogram: < from: TTE Echo Doppler w/o Cont (12.21.18 @ 17:20) >  1. Left ventricular ejection fraction, by visual estimation, is 60 to   65%.   2. Technically fair study.   3. Normal global left ventricular systolic function.   4. Normal left ventricular internal cavity size.   5. Spectral Doppler shows impaired relaxation pattern of left   ventricular myocardial filling (Grade I diastolic dysfunction).   6. There is mild concentric left ventricular hypertrophy.   7. Normal right ventricular size and function.   8. There is no evidence of pericardial effusion.   9. Mild mitral annular calcification.  10. Mild mitral valve regurgitation.  11. Thickening and calcification of the anterior and posterior mitral   valve leaflets.  12. Mild tricuspid regurgitation.  13. Mild aortic regurgitation.  14. Sclerotic aortic valve with normal opening.  15. Pulmonic valve regurgitation.  16. The aortic valve mean gradient is 4.6 mmHg consistent with normally   opening aortic valve.  17. There is mild aortic root calcification.    < end of copied text >      LABS:	 	    24 Dec 2018 07:48    141    |  109    |  17     ----------------------------<  189    3.7     |  26     |  0.85   23 Dec 2018 07:55    144    |  111    |  18     ----------------------------<  108    3.6     |  27     |  0.80   22 Dec 2018 04:03    143    |  111    |  18     ----------------------------<  218    4.0     |  26     |  0.91     Ca    8.1        24 Dec 2018 07:48  Phos  2.1       24 Dec 2018 07:48  Mg     2.3       23 Dec 2018 07:55                            10.5   8.91  )-----------( 136      ( 24 Dec 2018 07:48 )             32.8 ,                       10.3   9.49  )-----------( 135      ( 23 Dec 2018 07:55 )             32.9 ,                       10.3   12.76 )-----------( 139      ( 22 Dec 2018 04:03 )             33.7     HgA1c: Hemoglobin A1C, Whole Blood: 10.1 % (12-21 @ 09:06)    PT/PTT- ( 24 Dec 2018 14:31 )   PT: 13.3 sec;  PTT: x        INR: 1.18 ratio (12-24 @ 14:31)  INR: 1.29 ratio (12-22 @ 04:03) Patient is a 63y old  Male who presents with a chief complaint of respiratory failure (24 Dec 2018 10:10)      PAST MEDICAL & SURGICAL HISTORY:  Diabetes  HTN (hypertension)  CAD (coronary artery disease)  CVA (cerebral vascular accident)  Asthma  CHF  Cardiac pacemaker      INTERVAL HISTORY: Resting in bed, not in any cute distress, denies any chest pain or SOB   	  MEDICATIONS:  MEDICATIONS  (STANDING):  ALBUTerol    90 MICROgram(s) HFA Inhaler 1 Puff(s) Inhalation every 4 hours  ampicillin/sulbactam  IVPB 3 Gram(s) IV Intermittent every 6 hours  aspirin enteric coated 81 milliGRAM(s) Oral daily  atorvastatin 20 milliGRAM(s) Oral at bedtime  buDESOnide 160 MICROgram(s)/formoterol 4.5 MICROgram(s) Inhaler 2 Puff(s) Inhalation two times a day  carvedilol 12.5 milliGRAM(s) Oral every 12 hours  chlorhexidine 4% Liquid 1 Application(s) Topical <User Schedule>  dextrose 5%. 1000 milliLiter(s) (50 mL/Hr) IV Continuous <Continuous>  dextrose 50% Injectable 12.5 Gram(s) IV Push once  dextrose 50% Injectable 25 Gram(s) IV Push once  dextrose 50% Injectable 25 Gram(s) IV Push once  enoxaparin Injectable 80 milliGRAM(s) SubCutaneous every 12 hours  folic acid 1 milliGRAM(s) Oral daily  furosemide    Tablet 20 milliGRAM(s) Oral daily  influenza   Vaccine 0.5 milliLiter(s) IntraMuscular once  insulin glargine Injectable (LANTUS) 15 Unit(s) SubCutaneous at bedtime  insulin lispro (HumaLOG) corrective regimen sliding scale   SubCutaneous Before meals and at bedtime  insulin lispro Injectable (HumaLOG) 4 Unit(s) SubCutaneous before breakfast  insulin lispro Injectable (HumaLOG) 4 Unit(s) SubCutaneous before lunch  insulin lispro Injectable (HumaLOG) 4 Unit(s) SubCutaneous before dinner  lisinopril 20 milliGRAM(s) Oral daily  predniSONE   Tablet 30 milliGRAM(s) Oral daily  thiamine 100 milliGRAM(s) Oral daily  tiotropium 18 MICROgram(s) Capsule 1 Capsule(s) Inhalation daily    MEDICATIONS  (PRN):  dextrose 40% Gel 15 Gram(s) Oral once PRN Blood Glucose LESS THAN 70 milliGRAM(s)/deciliter  glucagon  Injectable 1 milliGRAM(s) IntraMuscular once PRN Glucose LESS THAN 70 milligrams/deciliter      Vitals:  T(F): 97.9 (12-24-18 @ 11:06), Max: 97.9 (12-24-18 @ 11:06)  HR: 66 (12-24-18 @ 11:06) (52 - 70)  BP: 142/83 (12-24-18 @ 11:06) (135/75 - 162/87)  RR: 17 (12-24-18 @ 11:06) (16 - 18)  SpO2: 99% (12-24-18 @ 11:06) (97% - 100%)    12-23 @ 07:01  -  12-24 @ 07:00  --------------------------------------------------------  IN:    Oral Fluid: 640 mL    Solution: 200 mL  Total IN: 840 mL    OUT:    Voided: 1050 mL  Total OUT: 1050 mL    Total NET: -210 mL      12-24 @ 07:01  -  12-24 @ 14:59  --------------------------------------------------------  IN:    Oral Fluid: 700 mL  Total IN: 700 mL    OUT:    Voided: 1050 mL  Total OUT: 1050 mL    Total NET: -350 mL    PHYSICAL EXAM:  Neuro: Awake, responsive  CV: S1 S2 RRR  Lungs: CTABL  GI: Soft, BS +, ND, NT  Extremities: Lt UE edema    RADIOLOGY:   < from: Xray Chest 1 View AP/PA. (12.21.18 @ 07:36) >  A frontal chest film again demonstrates the predominantly clear lungs   with mild prominence of interstitial markings..    Heart is mildly enlarged. Pacemaker in situ.      Support devices in situ. .     The osseous structures appear intact intact.     IMPRESSION:  No acute airspace disease or consolidation suggested. Support devices in   situ.    < end of copied text >  < from: US Duplex Venous Upper Ext Ltd, Left (12.23.18 @ 10:58) >    Soft tissue swelling.    Positive deep vein thrombus within proximal left arm basilic veins and   nonocclusive focal clot seen within the mid subclavian vein.    < end of copied text >   < from: CT Abdomen and Pelvis w/ Oral Cont and w/ IV Cont (12.23.18 @ 11:09) >  1. Mild right pleural effusion.   2. Consolidation in both lower lobes may represent atelectasis or   pneumonia..   3. 18 mm nodule upper pole right kidney 25 Hounsfield units. Recommend   surgery.  4. Air in the bladder may reflect recent instrumentation or infection.        Addendum:      Small bilateral pleural effusions, larger on the right with mild   overlying atelectasis.    There are multiple renal cysts with largest cyst measuring up to   approximately 4 cm in each kidney. I contacted ad radiologist Marcy Harris. The 18 mm nodule upper pole right kidney that she is referring to   is the most superior lesion in the right kidney. This has the appearance   of a cyst and surgery is not recommended.    In addition, trace pericardial effusion is seen.    There is mild fatty infiltration of the liver.    Nonspecific trace fluid seen right upper abdomen and trace fluid right   lower quadrant at the unremarkable appearing appendix.    Small amount of air is noted in the bilateral anterior abdominal wall   soft tissues presumably iatrogenic, i.e. related to injection.    Degenerative changes L4-5. Small nonspecific lucency seen in the L2   vertebral body.    < end of copied text >                                                                                                                                                                                                                                                                                                                         DIAGNOSTIC TESTING:    [x ] Echocardiogram: < from: TTE Echo Doppler w/o Cont (12.21.18 @ 17:20) >  1. Left ventricular ejection fraction, by visual estimation, is 60 to   65%.   2. Technically fair study.   3. Normal global left ventricular systolic function.   4. Normal left ventricular internal cavity size.   5. Spectral Doppler shows impaired relaxation pattern of left   ventricular myocardial filling (Grade I diastolic dysfunction).   6. There is mild concentric left ventricular hypertrophy.   7. Normal right ventricular size and function.   8. There is no evidence of pericardial effusion.   9. Mild mitral annular calcification.  10. Mild mitral valve regurgitation.  11. Thickening and calcification of the anterior and posterior mitral   valve leaflets.  12. Mild tricuspid regurgitation.  13. Mild aortic regurgitation.  14. Sclerotic aortic valve with normal opening.  15. Pulmonic valve regurgitation.  16. The aortic valve mean gradient is 4.6 mmHg consistent with normally   opening aortic valve.  17. There is mild aortic root calcification.    < end of copied text >      LABS:	 	    24 Dec 2018 07:48    141    |  109    |  17     ----------------------------<  189    3.7     |  26     |  0.85   23 Dec 2018 07:55    144    |  111    |  18     ----------------------------<  108    3.6     |  27     |  0.80   22 Dec 2018 04:03    143    |  111    |  18     ----------------------------<  218    4.0     |  26     |  0.91     Ca    8.1        24 Dec 2018 07:48  Phos  2.1       24 Dec 2018 07:48  Mg     2.3       23 Dec 2018 07:55                            10.5   8.91  )-----------( 136      ( 24 Dec 2018 07:48 )             32.8 ,                       10.3   9.49  )-----------( 135      ( 23 Dec 2018 07:55 )             32.9 ,                       10.3   12.76 )-----------( 139      ( 22 Dec 2018 04:03 )             33.7     HgA1c: Hemoglobin A1C, Whole Blood: 10.1 % (12-21 @ 09:06)    PT/PTT- ( 24 Dec 2018 14:31 )   PT: 13.3 sec;  PTT: x        INR: 1.18 ratio (12-24 @ 14:31)  INR: 1.29 ratio (12-22 @ 04:03)

## 2018-12-24 NOTE — PROGRESS NOTE ADULT - SUBJECTIVE AND OBJECTIVE BOX
HPI:  63 years old male by ems s/p et intubated c/o respiratory arrest. EMS sts pt was in a restaurant c/o sob and ems noted pt has a neb pump next to him pt was sob, unable to talk. Pt here is et intubated and breath sounds + expiratory wheezing bilaterally. Pt's wife is here at bed side now states pt had asthma attack one week ago was discharge from hospital with steroids  Recent history of PPM/AICD implantation at [facility] as per family. Patient seen and evaluated at bedside with ICU attending. CT head negative for acute infarct or bleed. CTA done  No CT evidence of pulmonary embolism. full report in chart. (20 Dec 2018 18:25)      Allergies    No Known Allergies    Intolerances        MEDICATIONS  (STANDING):  ALBUTerol    90 MICROgram(s) HFA Inhaler 1 Puff(s) Inhalation every 4 hours  ampicillin/sulbactam  IVPB 3 Gram(s) IV Intermittent every 6 hours  aspirin enteric coated 81 milliGRAM(s) Oral daily  atorvastatin 20 milliGRAM(s) Oral at bedtime  buDESOnide 160 MICROgram(s)/formoterol 4.5 MICROgram(s) Inhaler 2 Puff(s) Inhalation two times a day  carvedilol 12.5 milliGRAM(s) Oral every 12 hours  chlorhexidine 4% Liquid 1 Application(s) Topical <User Schedule>  dextrose 5%. 1000 milliLiter(s) (50 mL/Hr) IV Continuous <Continuous>  dextrose 50% Injectable 12.5 Gram(s) IV Push once  dextrose 50% Injectable 25 Gram(s) IV Push once  dextrose 50% Injectable 25 Gram(s) IV Push once  enoxaparin Injectable 80 milliGRAM(s) SubCutaneous every 12 hours  folic acid 1 milliGRAM(s) Oral daily  furosemide    Tablet 20 milliGRAM(s) Oral daily  influenza   Vaccine 0.5 milliLiter(s) IntraMuscular once  insulin glargine Injectable (LANTUS) 15 Unit(s) SubCutaneous at bedtime  insulin lispro (HumaLOG) corrective regimen sliding scale   SubCutaneous Before meals and at bedtime  insulin lispro Injectable (HumaLOG) 4 Unit(s) SubCutaneous before breakfast  insulin lispro Injectable (HumaLOG) 4 Unit(s) SubCutaneous before lunch  insulin lispro Injectable (HumaLOG) 4 Unit(s) SubCutaneous before dinner  lisinopril 20 milliGRAM(s) Oral daily  predniSONE   Tablet 30 milliGRAM(s) Oral daily  thiamine 100 milliGRAM(s) Oral daily  tiotropium 18 MICROgram(s) Capsule 1 Capsule(s) Inhalation daily    MEDICATIONS  (PRN):  dextrose 40% Gel 15 Gram(s) Oral once PRN Blood Glucose LESS THAN 70 milliGRAM(s)/deciliter  glucagon  Injectable 1 milliGRAM(s) IntraMuscular once PRN Glucose LESS THAN 70 milligrams/deciliter      REVIEW OF SYSTEMS:    CONSTITUTIONAL: No fever, chills, weight loss, or fatigue  HEENT: No sore throat, runny nose, ear ache  RESPIRATORY: No cough, wheezing, No shortness of breath  CARDIOVASCULAR: No chest pain, palpitations, dizziness  GASTROINTESTINAL: No abdominal pain. No nausea, vomiting, diarrhea  GENITOURINARY: No dysuria, increase frequency, hematuria, or incontinence  NEUROLOGICAL: No headaches, memory loss, loss of strength, numbness, or tremors, no weakness  EXTREMITY: No pedal edema BLE  SKIN: No itching, burning, rashes, or lesions     VITAL SIGNS:  T(C): 36.4 (12-24-18 @ 05:06), Max: 36.6 (12-24-18 @ 00:08)  T(F): 97.6 (12-24-18 @ 05:06), Max: 97.8 (12-24-18 @ 00:08)  HR: 52 (12-24-18 @ 05:06) (52 - 85)  BP: 162/87 (12-24-18 @ 05:06) (106/61 - 162/87)  RR: 16 (12-24-18 @ 05:06) (16 - 18)  SpO2: 99% (12-24-18 @ 05:06) (97% - 100%)  Wt(kg): --    PHYSICAL EXAM:    GENERAL: not in any distress  HEENT: Neck is supple, normocephalic, atraumatic   CHEST/LUNG: Clear to percussion bilaterally; No rales, rhonchi, wheezing  HEART: Regular rate and rhythm; No murmurs, rubs, or gallops  ABDOMEN: Soft, Nontender, Nondistended; Bowel sounds present, no rebound   EXTREMITIES:  2+ Peripheral Pulses, No clubbing, cyanosis, or edema  GENITOURINARY:   SKIN: No rashes or lesions  BACK: no pressor sore   NERVOUS SYSTEM:  Alert & Oriented X3, Good concentration  PSYCH: normal affect     LABS:                         10.5   8.91  )-----------( 136      ( 24 Dec 2018 07:48 )             32.8     12-24    141  |  109<H>  |  17  ----------------------------<  189<H>  3.7   |  26  |  0.85    Ca    8.1<L>      24 Dec 2018 07:48  Phos  2.1     12-24  Mg     2.3     12-23                                Culture Results:   No growth to date. (12-21 @ 15:26)  Culture Results:   Growth in aerobic bottle: Enterococcus faecium and Pantoea agglomerans  Growth in anaerobic bottle: Coag Negative Staphylococcus  Single set isolate, possible contaminant. Contact  Microbiology if susceptibility testing clinically  indicated.  "Dueto technical problems, Proteus sp. will Not be reported as part of  the BCID panel until further notice"  ***Blood Panel PCR results on this specimen are available  approximately 3 hours after the Gram stain result.***  Gram stain, PCR, and/or culture results may not always  correspond due to difference in methodologies.  ************************************************************  This PCR assay was performed using [employer].  The following targets are tested for: Enterococcus,  vancomycin resistant enterococci, Listeria monocytogenes,  coagulase negative staphylococci, S. aureus,  methicillin resistant S. aureus, Streptococcus agalactiae  (Group B), S. pneumoniae, S. pyogenes (Group A),  Acinetobacter baumannii, Enterobacter cloacae, E. coli,  Klebsiella oxytoca, K. pneumoniae, Proteus sp.,  Serratia marcescens, Haemophilus influenzae,  Neisseria meningitidis, Pseudomonas aeruginosa, Candida  albicans, C. glabrata, C krusei, C parapsilosis,  C. tropicalis and the KPC resistancegene. (12-20 @ 19:02)  Culture Results:   Growth in aerobic bottle: Staphylococcus capitis "Susceptibilities not  performed"  "Due to technical problems, Proteus sp. will Not be reported as part of  the BCID panel until further notice"  ***Blood Panel PCR results on this specimen are available  approximately 3 hours after the Gram stain result.***  Gram stain, PCR, and/or culture results may not always  correspond due to difference in methodologies.  ************************************************************  This PCR assay was performed using [employer].  The following targets are tested for: Enterococcus,  vancomycin resistant enterococci, Listeria monocytogenes,  coagulase negative staphylococci, S. aureus,  methicillin resistant S. aureus, Streptococcus agalactiae  (Group B), S. pneumoniae, S. pyogenes (Group A),  Acinetobacter baumannii, Enterobacter cloacae, E. coli,  Klebsiella oxytoca, K. pneumoniae, Proteus sp.,  Serratia marcescens, Haemophilus influenzae,  Neisseria meningitidis, Pseudomonas aeruginosa, Candida  albicans, C. glabrata, C krusei, C parapsilosis,  C. tropicalis and the KPC resistance gene. (12-20 @ 19:02)  Culture Results:   No growth (12-20 @ 18:36)                Radiology:

## 2018-12-24 NOTE — PROGRESS NOTE ADULT - PROBLEM SELECTOR PLAN 5
cont therapeutic Lovenox for now.   consider Xarelto on discharge. cont therapeutic Lovenox for now.   Xarelto and eliquis too expensive and not covered by his medicaid.   will need to start coumadin therefore pt cant be discharge .until INR therapeutic .    on discharge.   continue with lovenox as well.

## 2018-12-25 LAB
-  AMIKACIN: SIGNIFICANT CHANGE UP
-  AMPICILLIN/SULBACTAM: SIGNIFICANT CHANGE UP
-  CEFEPIME: SIGNIFICANT CHANGE UP
-  CEFTAZIDIME: SIGNIFICANT CHANGE UP
-  CIPROFLOXACIN: SIGNIFICANT CHANGE UP
-  GENTAMICIN: SIGNIFICANT CHANGE UP
-  IMIPENEM: SIGNIFICANT CHANGE UP
-  LEVOFLOXACIN: SIGNIFICANT CHANGE UP
-  MEROPENEM: SIGNIFICANT CHANGE UP
-  PIPERACILLIN/TAZOBACTAM: SIGNIFICANT CHANGE UP
-  TOBRAMYCIN: SIGNIFICANT CHANGE UP
-  TRIMETHOPRIM/SULFAMETHOXAZOLE: SIGNIFICANT CHANGE UP
ANION GAP SERPL CALC-SCNC: 8 MMOL/L — SIGNIFICANT CHANGE UP (ref 5–17)
BUN SERPL-MCNC: 17 MG/DL — SIGNIFICANT CHANGE UP (ref 7–23)
CALCIUM SERPL-MCNC: 8 MG/DL — LOW (ref 8.5–10.1)
CHLORIDE SERPL-SCNC: 107 MMOL/L — SIGNIFICANT CHANGE UP (ref 96–108)
CO2 SERPL-SCNC: 26 MMOL/L — SIGNIFICANT CHANGE UP (ref 22–31)
CREAT SERPL-MCNC: 0.86 MG/DL — SIGNIFICANT CHANGE UP (ref 0.5–1.3)
CULTURE RESULTS: SIGNIFICANT CHANGE UP
GLUCOSE BLDC GLUCOMTR-MCNC: 142 MG/DL — HIGH (ref 70–99)
GLUCOSE BLDC GLUCOMTR-MCNC: 198 MG/DL — HIGH (ref 70–99)
GLUCOSE BLDC GLUCOMTR-MCNC: 228 MG/DL — HIGH (ref 70–99)
GLUCOSE BLDC GLUCOMTR-MCNC: 283 MG/DL — HIGH (ref 70–99)
GLUCOSE SERPL-MCNC: 174 MG/DL — HIGH (ref 70–99)
GRAM STN FLD: SIGNIFICANT CHANGE UP
INR BLD: 1.15 RATIO — SIGNIFICANT CHANGE UP (ref 0.88–1.16)
MAGNESIUM SERPL-MCNC: 2.1 MG/DL — SIGNIFICANT CHANGE UP (ref 1.6–2.6)
METHOD TYPE: SIGNIFICANT CHANGE UP
METHOD TYPE: SIGNIFICANT CHANGE UP
ORGANISM # SPEC MICROSCOPIC CNT: SIGNIFICANT CHANGE UP
PHOSPHATE SERPL-MCNC: 3 MG/DL — SIGNIFICANT CHANGE UP (ref 2.5–4.5)
POTASSIUM SERPL-MCNC: 3.6 MMOL/L — SIGNIFICANT CHANGE UP (ref 3.5–5.3)
POTASSIUM SERPL-SCNC: 3.6 MMOL/L — SIGNIFICANT CHANGE UP (ref 3.5–5.3)
PROTHROM AB SERPL-ACNC: 12.9 SEC — SIGNIFICANT CHANGE UP (ref 10–12.9)
SODIUM SERPL-SCNC: 141 MMOL/L — SIGNIFICANT CHANGE UP (ref 135–145)
SPECIMEN SOURCE: SIGNIFICANT CHANGE UP

## 2018-12-25 PROCEDURE — 99233 SBSQ HOSP IP/OBS HIGH 50: CPT

## 2018-12-25 RX ORDER — WARFARIN SODIUM 2.5 MG/1
10 TABLET ORAL ONCE
Qty: 0 | Refills: 0 | Status: COMPLETED | OUTPATIENT
Start: 2018-12-25 | End: 2018-12-25

## 2018-12-25 RX ADMIN — INSULIN GLARGINE 15 UNIT(S): 100 INJECTION, SOLUTION SUBCUTANEOUS at 20:57

## 2018-12-25 RX ADMIN — BUDESONIDE AND FORMOTEROL FUMARATE DIHYDRATE 2 PUFF(S): 160; 4.5 AEROSOL RESPIRATORY (INHALATION) at 17:27

## 2018-12-25 RX ADMIN — WARFARIN SODIUM 10 MILLIGRAM(S): 2.5 TABLET ORAL at 20:57

## 2018-12-25 RX ADMIN — Medication 30 MILLIGRAM(S): at 05:39

## 2018-12-25 RX ADMIN — ALBUTEROL 1 PUFF(S): 90 AEROSOL, METERED ORAL at 05:40

## 2018-12-25 RX ADMIN — CHLORHEXIDINE GLUCONATE 1 APPLICATION(S): 213 SOLUTION TOPICAL at 05:40

## 2018-12-25 RX ADMIN — Medication 4 UNIT(S): at 11:27

## 2018-12-25 RX ADMIN — LISINOPRIL 20 MILLIGRAM(S): 2.5 TABLET ORAL at 05:39

## 2018-12-25 RX ADMIN — ENOXAPARIN SODIUM 80 MILLIGRAM(S): 100 INJECTION SUBCUTANEOUS at 05:41

## 2018-12-25 RX ADMIN — AMPICILLIN SODIUM AND SULBACTAM SODIUM 200 GRAM(S): 250; 125 INJECTION, POWDER, FOR SUSPENSION INTRAMUSCULAR; INTRAVENOUS at 05:41

## 2018-12-25 RX ADMIN — CARVEDILOL PHOSPHATE 12.5 MILLIGRAM(S): 80 CAPSULE, EXTENDED RELEASE ORAL at 17:27

## 2018-12-25 RX ADMIN — Medication 4 UNIT(S): at 16:13

## 2018-12-25 RX ADMIN — ALBUTEROL 1 PUFF(S): 90 AEROSOL, METERED ORAL at 11:27

## 2018-12-25 RX ADMIN — Medication 81 MILLIGRAM(S): at 11:27

## 2018-12-25 RX ADMIN — Medication 100 MILLIGRAM(S): at 11:27

## 2018-12-25 RX ADMIN — CARVEDILOL PHOSPHATE 12.5 MILLIGRAM(S): 80 CAPSULE, EXTENDED RELEASE ORAL at 05:39

## 2018-12-25 RX ADMIN — Medication 4: at 08:39

## 2018-12-25 RX ADMIN — BUDESONIDE AND FORMOTEROL FUMARATE DIHYDRATE 2 PUFF(S): 160; 4.5 AEROSOL RESPIRATORY (INHALATION) at 05:40

## 2018-12-25 RX ADMIN — Medication 4 UNIT(S): at 08:38

## 2018-12-25 RX ADMIN — WARFARIN SODIUM 10 MILLIGRAM(S): 2.5 TABLET ORAL at 00:54

## 2018-12-25 RX ADMIN — AMPICILLIN SODIUM AND SULBACTAM SODIUM 200 GRAM(S): 250; 125 INJECTION, POWDER, FOR SUSPENSION INTRAMUSCULAR; INTRAVENOUS at 17:30

## 2018-12-25 RX ADMIN — ENOXAPARIN SODIUM 80 MILLIGRAM(S): 100 INJECTION SUBCUTANEOUS at 17:27

## 2018-12-25 RX ADMIN — Medication 1 MILLIGRAM(S): at 11:27

## 2018-12-25 RX ADMIN — AMPICILLIN SODIUM AND SULBACTAM SODIUM 200 GRAM(S): 250; 125 INJECTION, POWDER, FOR SUSPENSION INTRAMUSCULAR; INTRAVENOUS at 00:54

## 2018-12-25 RX ADMIN — TIOTROPIUM BROMIDE 1 CAPSULE(S): 18 CAPSULE ORAL; RESPIRATORY (INHALATION) at 11:27

## 2018-12-25 RX ADMIN — AMPICILLIN SODIUM AND SULBACTAM SODIUM 200 GRAM(S): 250; 125 INJECTION, POWDER, FOR SUSPENSION INTRAMUSCULAR; INTRAVENOUS at 11:32

## 2018-12-25 RX ADMIN — Medication 20 MILLIGRAM(S): at 05:39

## 2018-12-25 RX ADMIN — ATORVASTATIN CALCIUM 20 MILLIGRAM(S): 80 TABLET, FILM COATED ORAL at 20:57

## 2018-12-25 RX ADMIN — ALBUTEROL 1 PUFF(S): 90 AEROSOL, METERED ORAL at 14:03

## 2018-12-25 RX ADMIN — ALBUTEROL 1 PUFF(S): 90 AEROSOL, METERED ORAL at 22:00

## 2018-12-25 RX ADMIN — Medication 6: at 11:28

## 2018-12-25 RX ADMIN — ALBUTEROL 1 PUFF(S): 90 AEROSOL, METERED ORAL at 17:28

## 2018-12-25 RX ADMIN — AMPICILLIN SODIUM AND SULBACTAM SODIUM 200 GRAM(S): 250; 125 INJECTION, POWDER, FOR SUSPENSION INTRAMUSCULAR; INTRAVENOUS at 23:00

## 2018-12-25 NOTE — PROGRESS NOTE ADULT - PROBLEM SELECTOR PLAN 5
cont therapeutic Lovenox for now.   Xarelto and eliquis too expensive and not covered by his medicaid.    will need to start coumadin therefore pt cant be discharge .until INR therapeutic .    on discharge. INR today 1.15, 10 mg given 12/24. will give 10 mg 12/25   continue with lovenox as well.

## 2018-12-25 NOTE — PROGRESS NOTE ADULT - SUBJECTIVE AND OBJECTIVE BOX
Patient is a 63y old  Male who presents with a chief complaint of respiratory failure (22 Dec 2018 19:46)      INTERVAL HPI/ OVERNIGHT EVENTS: Pt was seen and examined at bedside today, No significant overnight events, pt denies SOB or wheezing.         MEDICATIONS  (STANDING):  ALBUTerol    90 MICROgram(s) HFA Inhaler 1 Puff(s) Inhalation every 4 hours  ampicillin/sulbactam  IVPB 3 Gram(s) IV Intermittent every 6 hours  aspirin enteric coated 81 milliGRAM(s) Oral daily  atorvastatin 20 milliGRAM(s) Oral at bedtime  buDESOnide 160 MICROgram(s)/formoterol 4.5 MICROgram(s) Inhaler 2 Puff(s) Inhalation two times a day  carvedilol 12.5 milliGRAM(s) Oral every 12 hours  chlorhexidine 4% Liquid 1 Application(s) Topical <User Schedule>  dextrose 5%. 1000 milliLiter(s) (50 mL/Hr) IV Continuous <Continuous>  dextrose 50% Injectable 12.5 Gram(s) IV Push once  dextrose 50% Injectable 25 Gram(s) IV Push once  dextrose 50% Injectable 25 Gram(s) IV Push once  enoxaparin Injectable 80 milliGRAM(s) SubCutaneous every 12 hours  folic acid 1 milliGRAM(s) Oral daily  furosemide    Tablet 20 milliGRAM(s) Oral daily  influenza   Vaccine 0.5 milliLiter(s) IntraMuscular once  insulin glargine Injectable (LANTUS) 15 Unit(s) SubCutaneous at bedtime  insulin lispro (HumaLOG) corrective regimen sliding scale   SubCutaneous Before meals and at bedtime  insulin lispro Injectable (HumaLOG) 4 Unit(s) SubCutaneous before breakfast  insulin lispro Injectable (HumaLOG) 4 Unit(s) SubCutaneous before lunch  insulin lispro Injectable (HumaLOG) 4 Unit(s) SubCutaneous before dinner  lisinopril 20 milliGRAM(s) Oral daily  predniSONE   Tablet 30 milliGRAM(s) Oral daily  thiamine 100 milliGRAM(s) Oral daily  tiotropium 18 MICROgram(s) Capsule 1 Capsule(s) Inhalation daily    MEDICATIONS  (PRN):  dextrose 40% Gel 15 Gram(s) Oral once PRN Blood Glucose LESS THAN 70 milliGRAM(s)/deciliter  glucagon  Injectable 1 milliGRAM(s) IntraMuscular once PRN Glucose LESS THAN 70 milligrams/deciliter    Intolerances    Vital Signs Last 24 Hrs  T(C): 36.4 (25 Dec 2018 05:30), Max: 36.6 (24 Dec 2018 11:06)  T(F): 97.6 (25 Dec 2018 05:30), Max: 97.9 (24 Dec 2018 11:06)  HR: 57 (25 Dec 2018 05:30) (57 - 81)  BP: 134/87 (25 Dec 2018 05:30) (108/53 - 142/83)  BP(mean): --  RR: 16 (25 Dec 2018 05:30) (16 - 18)  SpO2: 100% (25 Dec 2018 05:30) (97% - 100%)    PHYSICAL EXAM:  GENERAL: NAD, well-developed, well-groomed  HEAD:  Atraumatic, Normocephalic  EYES: conjunctiva and sclera clear  ENMT: Moist mucous membranes  NECK: Supple, No JVD, Normal thyroid  CHEST/LUNG: Clear to Auscultation bilaterally; No rales, rhonchi, wheezing, or rubs  HEART: Regular rate and rhythm; No murmurs, rubs, or gallops  ABDOMEN: Soft, Nontender, Nondistended; Bowel sounds present  EXTREMITIES:  2+ Peripheral Pulses, No clubbing, cyanosis, or edema  SKIN: No rashes or lesions  NERVOUS SYSTEM:  Alert & Oriented X3, Good concentration; Motor Strength 5/5 B/L upper and lower extremities    LABS:                                                          10.5   8.91  )-----------( 136      ( 24 Dec 2018 07:48 )             32.8     12-25    141  |  107  |  17  ----------------------------<  174<H>  3.6   |  26  |  0.86    Ca    8.0<L>      25 Dec 2018 06:17  Phos  3.0     12-25  Mg     2.1     12-25      PT/INR - ( 25 Dec 2018 06:55 )   PT: 12.9 sec;   INR: 1.15 ratio                Hemoglobin A1C, Whole Blood (12.21.18 @ 09:06)    Hemoglobin A1C, Whole Blood: 10.1: Method: Immunoassay       Reference Range                4.0-5.6%       High risk (prediabetic)        5.7-6.4%       Diabetic, diagnostic             >=6.5%       ADA diabetic treatment goal       <7.0%  The Hemoglobin A1c testing is NGSP-certified.Reference ranges are based  upon the 2010 recommendations of  the American Diabetes Association.  Interpretation may vary for children  and adolescents. %          CAPILLARY BLOOD GLUCOSE    CAPILLARY BLOOD GLUCOSE      POCT Blood Glucose.: 231 mg/dL (23 Dec 2018 21:34)  POCT Blood Glucose.: 260 mg/dL (23 Dec 2018 16:06)  POCT Blood Glucose.: 130 mg/dL (23 Dec 2018 11:27)          RADIOLOGY & ADDITIONAL TESTS:  < from: CT Abdomen and Pelvis w/ Oral Cont and w/ IV Cont (12.23.18 @ 11:09) >    IMPRESSION:   1. Mild right pleural effusion.   2. Consolidation in both lower lobes may represent atelectasis or   pneumonia..   3. 18 mm nodule upper pole right kidney 25 Hounsfield units. Recommend   surgery.  4. Air in the bladder may reflect recent instrumentation or infection.        Addendum:      Small bilateral pleural effusions, larger on the right with mild   overlying atelectasis.    There are multiple renal cysts with largest cyst measuring up to   approximately 4 cm in each kidney. I contacted [facility] radiologist Marcy Harris. The 18 mm nodule upper pole right kidney that she is referring to   is the most superior lesion in the right kidney. This has the appearance   of a cyst and surgery is not recommended.    In addition, trace pericardial effusion is seen.    There is mild fatty infiltration of the liver.    Nonspecific trace fluid seen right upper abdomen and trace fluid right   lower quadrant at the unremarkable appearing appendix.    Small amount of air is noted in the bilateral anterior abdominal wall   soft tissues presumably iatrogenic, i.e. related to injection.    Degenerative changes L4-5. Small nonspecific lucency seen in the L2   vertebral body.    Other findings as described in the [facility] report.      < end of copied text >          Imaging Personally Reviewed:  [ ] YES  [ ] NO    Consultant(s) Notes Reviewed:  [x ] YES  [ ] NO    Care Discussed with Consultants/Other Providers [x ] YES  [ ] NO

## 2018-12-25 NOTE — PROGRESS NOTE ADULT - SUBJECTIVE AND OBJECTIVE BOX
Patient is a 63y old  Male who presents with a chief complaint of respiratory failure likely astma related. Hx of Diabetes HTN CAD CVA Asthma HF pacemaker. Found with left upper extremity venous clot.      INTERVAL HISTORY: Resting, no distress, denies any chest pain or SOB .    Vitals:  Vital Signs Last 24 Hrs  T(C): 36.4 (25 Dec 2018 12:00), Max: 36.6 (24 Dec 2018 17:27)  T(F): 97.5 (25 Dec 2018 12:00), Max: 97.9 (24 Dec 2018 17:27)  HR: 68 (25 Dec 2018 12:00) (57 - 81)  BP: 125/79 (25 Dec 2018 12:00) (108/53 - 136/75)  RR: 16 (25 Dec 2018 12:00) (16 - 18)  SpO2: 99% (25 Dec 2018 12:00) (97% - 100%)    PHYSICAL EXAM:  Neuro: Awake, responsive  CV: S1 S2 RRR  Lungs: CTABL  GI: Soft, BS +, ND, NT  Extremities: less Lt UE edema    RADIOLOGY:   < from: Xray Chest 1 View AP/PA. (12.21.18 @ 07:36) >  A frontal chest film again demonstrates the predominantly clear lungs   with mild prominence of interstitial markings..    Heart is mildly enlarged. Pacemaker in situ.      Support devices in situ. .     The osseous structures appear intact intact.     IMPRESSION:  No acute airspace disease or consolidation suggested. Support devices in   situ.    < end of copied text >  < from: US Duplex Venous Upper Ext Ltd, Left (12.23.18 @ 10:58) >    Soft tissue swelling.    Positive deep vein thrombus within proximal left arm basilic veins and   nonocclusive focal clot seen within the mid subclavian vein.    < end of copied text >   < from: CT Abdomen and Pelvis w/ Oral Cont and w/ IV Cont (12.23.18 @ 11:09) >  1. Mild right pleural effusion.   2. Consolidation in both lower lobes may represent atelectasis or   pneumonia..   3. 18 mm nodule upper pole right kidney 25 Hounsfield units. Recommend   surgery.  4. Air in the bladder may reflect recent instrumentation or infection.        Addendum:      Small bilateral pleural effusions, larger on the right with mild   overlying atelectasis.    There are multiple renal cysts with largest cyst measuring up to   approximately 4 cm in each kidney. I contacted [facility] radiologist Marcy Harris. The 18 mm nodule upper pole right kidney that she is referring to   is the most superior lesion in the right kidney. This has the appearance   of a cyst and surgery is not recommended.    In addition, trace pericardial effusion is seen.    There is mild fatty infiltration of the liver.    Nonspecific trace fluid seen right upper abdomen and trace fluid right   lower quadrant at the unremarkable appearing appendix.    Small amount of air is noted in the bilateral anterior abdominal wall   soft tissues presumably iatrogenic, i.e. related to injection.    Degenerative changes L4-5. Small nonspecific lucency seen in the L2   vertebral body.    < end of copied text >                                                                                                                                                                                                                                                                                                                         DIAGNOSTIC TESTING:    [x ] Echocardiogram: < from: TTE Echo Doppler w/o Cont (12.21.18 @ 17:20) >  1. Left ventricular ejection fraction, by visual estimation, is 60 to   65%.   2. Technically fair study.   3. Normal global left ventricular systolic function.   4. Normal left ventricular internal cavity size.   5. Spectral Doppler shows impaired relaxation pattern of left   ventricular myocardial filling (Grade I diastolic dysfunction).   6. There is mild concentric left ventricular hypertrophy.   7. Normal right ventricular size and function.   8. There is no evidence of pericardial effusion.   9. Mild mitral annular calcification.  10. Mild mitral valve regurgitation.  11. Thickening and calcification of the anterior and posterior mitral   valve leaflets.  12. Mild tricuspid regurgitation.  13. Mild aortic regurgitation.  14. Sclerotic aortic valve with normal opening.  15. Pulmonic valve regurgitation.  16. The aortic valve mean gradient is 4.6 mmHg consistent with normally   opening aortic valve.  17. There is mild aortic root calcification.    < end of copied text >      LABS:	 	                        10.5   8.91  )-----------( 136      ( 24 Dec 2018 07:48 )             32.8     12-25    141  |  107  |  17  ----------------------------<  174<H>  3.6   |  26  |  0.86    Ca    8.0<L>      25 Dec 2018 06:17  Phos  3.0     12-25  Mg     2.1     12-25      HgA1c: Hemoglobin A1C, Whole Blood: 10.1 % (12-21 @ 09:06)    PT/PTT- ( 24 Dec 2018 14:31 )   PT: 13.3 sec;  PTT: x        INR: 1.18 ratio (12-24 @ 14:31)  INR: 1.29 ratio (12-22 @ 04:03)

## 2018-12-25 NOTE — PROGRESS NOTE ADULT - ASSESSMENT
63 years old male by ems s/p intubation c/o respiratory arrest. He had asthma attack one week ago was discharge from hospital with steroids.  Recent history of PPM implantation at [facility]. Chronic diastolic HF, CVA Hx, CAD hx  CT head negative for acute infarct or bleed.   CTA done  No CT evidence of pulmonary embolism.  Positive deep vein thrombus within proximal left arm basilic veins and   nonocclusive focal clot seen within the mid subclavian vein.  Patient was extubated on 12/21, being managed on medical floor now  blood culture: Growth in aerobic bottle: Gram Positive Cocci in Clusters   Echo: EF 60-65%, mild MR/AR/TR, grade I DD    Plan    MEDICATIONS  (STANDING):  ALBUTerol    90 MICROgram(s) HFA Inhaler 1 Puff(s) Inhalation every 4 hours  ampicillin/sulbactam  IVPB 3 Gram(s) IV Intermittent every 6 hours  aspirin enteric coated 81 milliGRAM(s) Oral daily  atorvastatin 20 milliGRAM(s) Oral at bedtime  buDESOnide 160 MICROgram(s)/formoterol 4.5 MICROgram(s) Inhaler 2 Puff(s) Inhalation two times a day  carvedilol 12.5 milliGRAM(s) Oral every 12 hours  chlorhexidine 4% Liquid 1 Application(s) Topical <User Schedule>  enoxaparin Injectable 80 milliGRAM(s) SubCutaneous every 12 hours  folic acid 1 milliGRAM(s) Oral daily  furosemide    Tablet 20 milliGRAM(s) Oral daily  influenza   Vaccine 0.5 milliLiter(s) IntraMuscular once  insulin glargine Injectable (LANTUS) 15 Unit(s) SubCutaneous at bedtime  insulin lispro (HumaLOG) corrective regimen sliding scale   SubCutaneous Before meals and at bedtime  insulin lispro Injectable (HumaLOG) 4 Unit(s) SubCutaneous before breakfast  insulin lispro Injectable (HumaLOG) 4 Unit(s) SubCutaneous before lunch  insulin lispro Injectable (HumaLOG) 4 Unit(s) SubCutaneous before dinner  lisinopril 20 milliGRAM(s) Oral daily  predniSONE   Tablet 30 milliGRAM(s) Oral daily  thiamine 100 milliGRAM(s) Oral daily  tiotropium 18 MICROgram(s) Capsule 1 Capsule(s) Inhalation daily  warfarin 10 milliGRAM(s) Oral once    Gutierrez Stovall MD, [facility]

## 2018-12-26 DIAGNOSIS — N17.9 ACUTE KIDNEY FAILURE, UNSPECIFIED: ICD-10-CM

## 2018-12-26 DIAGNOSIS — A41.9 SEPSIS, UNSPECIFIED ORGANISM: ICD-10-CM

## 2018-12-26 LAB
CULTURE RESULTS: SIGNIFICANT CHANGE UP
GLUCOSE BLDC GLUCOMTR-MCNC: 178 MG/DL — HIGH (ref 70–99)
GLUCOSE BLDC GLUCOMTR-MCNC: 187 MG/DL — HIGH (ref 70–99)
GLUCOSE BLDC GLUCOMTR-MCNC: 275 MG/DL — HIGH (ref 70–99)
GLUCOSE BLDC GLUCOMTR-MCNC: 298 MG/DL — HIGH (ref 70–99)
HCT VFR BLD CALC: 36 % — LOW (ref 39–50)
HGB BLD-MCNC: 11 G/DL — LOW (ref 13–17)
INR BLD: 1.32 RATIO — HIGH (ref 0.88–1.16)
MCHC RBC-ENTMCNC: 27.5 PG — SIGNIFICANT CHANGE UP (ref 27–34)
MCHC RBC-ENTMCNC: 30.6 GM/DL — LOW (ref 32–36)
MCV RBC AUTO: 90 FL — SIGNIFICANT CHANGE UP (ref 80–100)
NRBC # BLD: 0 /100 WBCS — SIGNIFICANT CHANGE UP (ref 0–0)
PLATELET # BLD AUTO: 151 K/UL — SIGNIFICANT CHANGE UP (ref 150–400)
PROTHROM AB SERPL-ACNC: 14.9 SEC — HIGH (ref 10–12.9)
RBC # BLD: 4 M/UL — LOW (ref 4.2–5.8)
RBC # FLD: 14.8 % — HIGH (ref 10.3–14.5)
SPECIMEN SOURCE: SIGNIFICANT CHANGE UP
WBC # BLD: 7.78 K/UL — SIGNIFICANT CHANGE UP (ref 3.8–10.5)
WBC # FLD AUTO: 7.78 K/UL — SIGNIFICANT CHANGE UP (ref 3.8–10.5)

## 2018-12-26 PROCEDURE — 99232 SBSQ HOSP IP/OBS MODERATE 35: CPT

## 2018-12-26 RX ORDER — WARFARIN SODIUM 2.5 MG/1
10 TABLET ORAL ONCE
Qty: 0 | Refills: 0 | Status: COMPLETED | OUTPATIENT
Start: 2018-12-26 | End: 2018-12-26

## 2018-12-26 RX ADMIN — AMPICILLIN SODIUM AND SULBACTAM SODIUM 200 GRAM(S): 250; 125 INJECTION, POWDER, FOR SUSPENSION INTRAMUSCULAR; INTRAVENOUS at 11:16

## 2018-12-26 RX ADMIN — Medication 20 MILLIGRAM(S): at 05:31

## 2018-12-26 RX ADMIN — CARVEDILOL PHOSPHATE 12.5 MILLIGRAM(S): 80 CAPSULE, EXTENDED RELEASE ORAL at 17:22

## 2018-12-26 RX ADMIN — ATORVASTATIN CALCIUM 20 MILLIGRAM(S): 80 TABLET, FILM COATED ORAL at 21:32

## 2018-12-26 RX ADMIN — LISINOPRIL 20 MILLIGRAM(S): 2.5 TABLET ORAL at 05:30

## 2018-12-26 RX ADMIN — Medication 6: at 16:27

## 2018-12-26 RX ADMIN — Medication 6: at 11:11

## 2018-12-26 RX ADMIN — WARFARIN SODIUM 10 MILLIGRAM(S): 2.5 TABLET ORAL at 21:32

## 2018-12-26 RX ADMIN — BUDESONIDE AND FORMOTEROL FUMARATE DIHYDRATE 2 PUFF(S): 160; 4.5 AEROSOL RESPIRATORY (INHALATION) at 17:22

## 2018-12-26 RX ADMIN — CARVEDILOL PHOSPHATE 12.5 MILLIGRAM(S): 80 CAPSULE, EXTENDED RELEASE ORAL at 05:30

## 2018-12-26 RX ADMIN — INSULIN GLARGINE 15 UNIT(S): 100 INJECTION, SOLUTION SUBCUTANEOUS at 21:32

## 2018-12-26 RX ADMIN — AMPICILLIN SODIUM AND SULBACTAM SODIUM 200 GRAM(S): 250; 125 INJECTION, POWDER, FOR SUSPENSION INTRAMUSCULAR; INTRAVENOUS at 05:31

## 2018-12-26 RX ADMIN — Medication 4 UNIT(S): at 07:57

## 2018-12-26 RX ADMIN — ENOXAPARIN SODIUM 80 MILLIGRAM(S): 100 INJECTION SUBCUTANEOUS at 05:31

## 2018-12-26 RX ADMIN — Medication 30 MILLIGRAM(S): at 05:30

## 2018-12-26 RX ADMIN — Medication 2: at 07:55

## 2018-12-26 RX ADMIN — TIOTROPIUM BROMIDE 1 CAPSULE(S): 18 CAPSULE ORAL; RESPIRATORY (INHALATION) at 11:15

## 2018-12-26 RX ADMIN — AMPICILLIN SODIUM AND SULBACTAM SODIUM 200 GRAM(S): 250; 125 INJECTION, POWDER, FOR SUSPENSION INTRAMUSCULAR; INTRAVENOUS at 23:54

## 2018-12-26 RX ADMIN — AMPICILLIN SODIUM AND SULBACTAM SODIUM 200 GRAM(S): 250; 125 INJECTION, POWDER, FOR SUSPENSION INTRAMUSCULAR; INTRAVENOUS at 17:48

## 2018-12-26 RX ADMIN — ALBUTEROL 1 PUFF(S): 90 AEROSOL, METERED ORAL at 13:14

## 2018-12-26 RX ADMIN — ALBUTEROL 1 PUFF(S): 90 AEROSOL, METERED ORAL at 10:50

## 2018-12-26 RX ADMIN — Medication 4 UNIT(S): at 16:28

## 2018-12-26 RX ADMIN — Medication 2: at 21:32

## 2018-12-26 RX ADMIN — Medication 4 UNIT(S): at 11:16

## 2018-12-26 RX ADMIN — ALBUTEROL 1 PUFF(S): 90 AEROSOL, METERED ORAL at 23:54

## 2018-12-26 RX ADMIN — Medication 1 MILLIGRAM(S): at 11:15

## 2018-12-26 RX ADMIN — ALBUTEROL 1 PUFF(S): 90 AEROSOL, METERED ORAL at 17:21

## 2018-12-26 RX ADMIN — Medication 81 MILLIGRAM(S): at 11:15

## 2018-12-26 RX ADMIN — ENOXAPARIN SODIUM 80 MILLIGRAM(S): 100 INJECTION SUBCUTANEOUS at 17:22

## 2018-12-26 RX ADMIN — BUDESONIDE AND FORMOTEROL FUMARATE DIHYDRATE 2 PUFF(S): 160; 4.5 AEROSOL RESPIRATORY (INHALATION) at 05:31

## 2018-12-26 RX ADMIN — ALBUTEROL 1 PUFF(S): 90 AEROSOL, METERED ORAL at 05:32

## 2018-12-26 RX ADMIN — Medication 100 MILLIGRAM(S): at 11:15

## 2018-12-26 NOTE — PROGRESS NOTE ADULT - PROBLEM SELECTOR PROBLEM 7
Type 2 diabetes mellitus with complication, with long-term current use of insulin Cerebrovascular accident (CVA), unspecified mechanism

## 2018-12-26 NOTE — PROGRESS NOTE ADULT - PROBLEM SELECTOR PROBLEM 4
Acute renal failure, unspecified acute renal failure type Acute deep vein thrombosis (DVT) of other vein of upper extremity

## 2018-12-26 NOTE — PROGRESS NOTE ADULT - PROBLEM SELECTOR PLAN 4
Resolved with IVF  Hypophosphatemia: resolved cont therapeutic Lovenox for now.   Xarelto and eliquis too expensive and not covered by his medicaid.    started coumadin 10mg on 12/24/18 INR 1.32 on today .      continue with lovenox as well.

## 2018-12-26 NOTE — PROGRESS NOTE ADULT - SUBJECTIVE AND OBJECTIVE BOX
Patient is a 63y old  Male who presents with a chief complaint of respiratory failure (26 Dec 2018 08:48)      PAST MEDICAL & SURGICAL HISTORY:  Diabetes  HTN (hypertension)  CAD (coronary artery disease)  CVA (cerebral vascular accident)  CHF  Asthma  Cardiac pacemaker      INTERVAL HISTORY: Resting in bed, not in any acute distress   	  MEDICATIONS:  MEDICATIONS  (STANDING):  ALBUTerol    90 MICROgram(s) HFA Inhaler 1 Puff(s) Inhalation every 4 hours  ampicillin/sulbactam  IVPB 3 Gram(s) IV Intermittent every 6 hours  aspirin enteric coated 81 milliGRAM(s) Oral daily  atorvastatin 20 milliGRAM(s) Oral at bedtime  buDESOnide 160 MICROgram(s)/formoterol 4.5 MICROgram(s) Inhaler 2 Puff(s) Inhalation two times a day  carvedilol 12.5 milliGRAM(s) Oral every 12 hours  chlorhexidine 4% Liquid 1 Application(s) Topical <User Schedule>  dextrose 5%. 1000 milliLiter(s) (50 mL/Hr) IV Continuous <Continuous>  dextrose 50% Injectable 12.5 Gram(s) IV Push once  dextrose 50% Injectable 25 Gram(s) IV Push once  dextrose 50% Injectable 25 Gram(s) IV Push once  enoxaparin Injectable 80 milliGRAM(s) SubCutaneous every 12 hours  folic acid 1 milliGRAM(s) Oral daily  furosemide    Tablet 20 milliGRAM(s) Oral daily  influenza   Vaccine 0.5 milliLiter(s) IntraMuscular once  insulin glargine Injectable (LANTUS) 15 Unit(s) SubCutaneous at bedtime  insulin lispro (HumaLOG) corrective regimen sliding scale   SubCutaneous Before meals and at bedtime  insulin lispro Injectable (HumaLOG) 4 Unit(s) SubCutaneous before breakfast  insulin lispro Injectable (HumaLOG) 4 Unit(s) SubCutaneous before lunch  insulin lispro Injectable (HumaLOG) 4 Unit(s) SubCutaneous before dinner  lisinopril 20 milliGRAM(s) Oral daily  predniSONE   Tablet 20 milliGRAM(s) Oral daily  thiamine 100 milliGRAM(s) Oral daily  tiotropium 18 MICROgram(s) Capsule 1 Capsule(s) Inhalation daily  warfarin 10 milliGRAM(s) Oral once    MEDICATIONS  (PRN):  dextrose 40% Gel 15 Gram(s) Oral once PRN Blood Glucose LESS THAN 70 milliGRAM(s)/deciliter  glucagon  Injectable 1 milliGRAM(s) IntraMuscular once PRN Glucose LESS THAN 70 milligrams/deciliter      Vitals:  T(F): 97.7 (12-26-18 @ 11:10), Max: 98.2 (12-26-18 @ 01:01)  HR: 61 (12-26-18 @ 11:10) (60 - 72)  BP: 163/88 (12-26-18 @ 11:10) (127/82 - 163/88)  RR: 16 (12-26-18 @ 11:10) (16 - 18)  SpO2: 98% (12-26-18 @ 11:10) (98% - 100%)  Wt(kg): -- 82. 4kg    12-25 @ 07:01  -  12-26 @ 07:00  --------------------------------------------------------  IN:    Oral Fluid: 250 mL  Total IN: 250 mL    OUT:    Voided: 400 mL  Total OUT: 400 mL    Total NET: -150 mL    PHYSICAL EXAM:  Neuro: Awake, responsive  CV: S1 S2 RRR  Lungs: CTABL  GI: Soft, BS +, ND, NT  Extremities: No edema    RADIOLOGY: < from: CT Abdomen and Pelvis w/ Oral Cont and w/ IV Cont (12.23.18 @ 11:09) >  IMPRESSION:   1. Mild right pleural effusion.   2. Consolidation in both lower lobes may represent atelectasis or   pneumonia..   3. 18 mm nodule upper pole right kidney 25 Hounsfield units. Recommend   surgery.  4. Air in the bladder may reflect recent instrumentation or infection.    Small bilateral pleural effusions, larger on the right with mild   overlying atelectasis.    There are multiple renal cysts with largest cyst measuring up to   approximately 4 cm in each kidney. I contacted ad radiologist Marcy Harris. The 18 mm nodule upper pole right kidney that she is referring to   is the most superior lesion in the right kidney. This has the appearance   of a cyst and surgery is not recommended.    In addition, trace pericardial effusion is seen.    There is mild fatty infiltration of the liver.    Nonspecific trace fluid seen right upper abdomen and trace fluid right   lower quadrant at the unremarkable appearing appendix.    Small amount of air is noted in the bilateral anterior abdominal wall   soft tissues presumably iatrogenic, i.e. related to injection.    Degenerative changes L4-5. Small nonspecific lucency seen in the L2   vertebral body.    DIAGNOSTIC TESTING:    [x Echocardiogram:  < from: TTE Echo Doppler w/o Cont (12.21.18 @ 17:20) >   1. Left ventricular ejection fraction, by visual estimation, is 60 to   65%.   2. Technically fair study.   3. Normal global left ventricular systolic function.   4. Normal left ventricular internal cavity size.   5. Spectral Doppler shows impaired relaxation pattern of left   ventricular myocardial filling (Grade I diastolic dysfunction).   6. There is mild concentric left ventricular hypertrophy.   7. Normal right ventricular size and function.   8. There is no evidence of pericardial effusion.   9. Mild mitral annular calcification.  10. Mild mitral valve regurgitation.  11. Thickening and calcification of the anterior and posterior mitral   valve leaflets.  12. Mild tricuspid regurgitation.  13. Mild aortic regurgitation.  14. Sclerotic aortic valve with normal opening.  15. Pulmonic valve regurgitation.  16. The aortic valve mean gradient is 4.6 mmHg consistent with normally   opening aortic valve.  17. There is mild aortic root calcification.    < end of copied text >      LABS:	 	    25 Dec 2018 06:17    141    |  107    |  17     ----------------------------<  174    3.6     |  26     |  0.86   24 Dec 2018 07:48    141    |  109    |  17     ----------------------------<  189    3.7     |  26     |  0.85     Ca    8.0        25 Dec 2018 06:17  Phos  3.0       25 Dec 2018 06:17  Mg     2.1       25 Dec 2018 06:17                        11.0   7.78  )-----------( 151      ( 26 Dec 2018 07:23 )             36.0 ,                       10.5   8.91  )-----------( 136      ( 24 Dec 2018 07:48 )             32.8     PT/PTT- ( 26 Dec 2018 07:23 )   PT: 14.9 sec;  PTT: x         INR: 1.32 ratio (12-26 @ 07:23)  INR: 1.15 ratio (12-25 @ 06:55)  INR: 1.18 ratio (12-24 @ 14:31) Patient is a 63y old  Male who presents with a chief complaint of respiratory failure (26 Dec 2018 08:48)      PAST MEDICAL & SURGICAL HISTORY:  Diabetes  HTN (hypertension)  CAD (coronary artery disease)  CVA (cerebral vascular accident)  CHF  Asthma  Cardiac pacemaker      INTERVAL HISTORY: Resting in bed, not in any acute distress, denies any chest pain or sob    	  MEDICATIONS:  MEDICATIONS  (STANDING):  ALBUTerol    90 MICROgram(s) HFA Inhaler 1 Puff(s) Inhalation every 4 hours  ampicillin/sulbactam  IVPB 3 Gram(s) IV Intermittent every 6 hours  aspirin enteric coated 81 milliGRAM(s) Oral daily  atorvastatin 20 milliGRAM(s) Oral at bedtime  buDESOnide 160 MICROgram(s)/formoterol 4.5 MICROgram(s) Inhaler 2 Puff(s) Inhalation two times a day  carvedilol 12.5 milliGRAM(s) Oral every 12 hours  chlorhexidine 4% Liquid 1 Application(s) Topical <User Schedule>  dextrose 5%. 1000 milliLiter(s) (50 mL/Hr) IV Continuous <Continuous>  dextrose 50% Injectable 12.5 Gram(s) IV Push once  dextrose 50% Injectable 25 Gram(s) IV Push once  dextrose 50% Injectable 25 Gram(s) IV Push once  enoxaparin Injectable 80 milliGRAM(s) SubCutaneous every 12 hours  folic acid 1 milliGRAM(s) Oral daily  furosemide    Tablet 20 milliGRAM(s) Oral daily  influenza   Vaccine 0.5 milliLiter(s) IntraMuscular once  insulin glargine Injectable (LANTUS) 15 Unit(s) SubCutaneous at bedtime  insulin lispro (HumaLOG) corrective regimen sliding scale   SubCutaneous Before meals and at bedtime  insulin lispro Injectable (HumaLOG) 4 Unit(s) SubCutaneous before breakfast  insulin lispro Injectable (HumaLOG) 4 Unit(s) SubCutaneous before lunch  insulin lispro Injectable (HumaLOG) 4 Unit(s) SubCutaneous before dinner  lisinopril 20 milliGRAM(s) Oral daily  predniSONE   Tablet 20 milliGRAM(s) Oral daily  thiamine 100 milliGRAM(s) Oral daily  tiotropium 18 MICROgram(s) Capsule 1 Capsule(s) Inhalation daily  warfarin 10 milliGRAM(s) Oral once    MEDICATIONS  (PRN):  dextrose 40% Gel 15 Gram(s) Oral once PRN Blood Glucose LESS THAN 70 milliGRAM(s)/deciliter  glucagon  Injectable 1 milliGRAM(s) IntraMuscular once PRN Glucose LESS THAN 70 milligrams/deciliter      Vitals:  T(F): 97.7 (12-26-18 @ 11:10), Max: 98.2 (12-26-18 @ 01:01)  HR: 61 (12-26-18 @ 11:10) (60 - 72)  BP: 163/88 (12-26-18 @ 11:10) (127/82 - 163/88)  RR: 16 (12-26-18 @ 11:10) (16 - 18)  SpO2: 98% (12-26-18 @ 11:10) (98% - 100%)  Wt(kg): -- 82. 4kg    12-25 @ 07:01  -  12-26 @ 07:00  --------------------------------------------------------  IN:    Oral Fluid: 250 mL  Total IN: 250 mL    OUT:    Voided: 400 mL  Total OUT: 400 mL    Total NET: -150 mL    PHYSICAL EXAM:  Neuro: Awake, responsive  CV: S1 S2 RRR  Lungs: CTABL  GI: Soft, BS +, ND, NT  Extremities: Left UE edema    RADIOLOGY: < from: CT Abdomen and Pelvis w/ Oral Cont and w/ IV Cont (12.23.18 @ 11:09) >  IMPRESSION:   1. Mild right pleural effusion.   2. Consolidation in both lower lobes may represent atelectasis or   pneumonia..   3. 18 mm nodule upper pole right kidney 25 Hounsfield units. Recommend   surgery.  4. Air in the bladder may reflect recent instrumentation or infection.    Small bilateral pleural effusions, larger on the right with mild   overlying atelectasis.    There are multiple renal cysts with largest cyst measuring up to   approximately 4 cm in each kidney. I contacted ad radiologist Marcy Harris. The 18 mm nodule upper pole right kidney that she is referring to   is the most superior lesion in the right kidney. This has the appearance   of a cyst and surgery is not recommended.    In addition, trace pericardial effusion is seen.    There is mild fatty infiltration of the liver.    Nonspecific trace fluid seen right upper abdomen and trace fluid right   lower quadrant at the unremarkable appearing appendix.    Small amount of air is noted in the bilateral anterior abdominal wall   soft tissues presumably iatrogenic, i.e. related to injection.    Degenerative changes L4-5. Small nonspecific lucency seen in the L2   vertebral body.    DIAGNOSTIC TESTING:    [x Echocardiogram:  < from: TTE Echo Doppler w/o Cont (12.21.18 @ 17:20) >   1. Left ventricular ejection fraction, by visual estimation, is 60 to   65%.   2. Technically fair study.   3. Normal global left ventricular systolic function.   4. Normal left ventricular internal cavity size.   5. Spectral Doppler shows impaired relaxation pattern of left   ventricular myocardial filling (Grade I diastolic dysfunction).   6. There is mild concentric left ventricular hypertrophy.   7. Normal right ventricular size and function.   8. There is no evidence of pericardial effusion.   9. Mild mitral annular calcification.  10. Mild mitral valve regurgitation.  11. Thickening and calcification of the anterior and posterior mitral   valve leaflets.  12. Mild tricuspid regurgitation.  13. Mild aortic regurgitation.  14. Sclerotic aortic valve with normal opening.  15. Pulmonic valve regurgitation.  16. The aortic valve mean gradient is 4.6 mmHg consistent with normally   opening aortic valve.  17. There is mild aortic root calcification.    < end of copied text >      LABS:	 	    25 Dec 2018 06:17    141    |  107    |  17     ----------------------------<  174    3.6     |  26     |  0.86   24 Dec 2018 07:48    141    |  109    |  17     ----------------------------<  189    3.7     |  26     |  0.85     Ca    8.0        25 Dec 2018 06:17  Phos  3.0       25 Dec 2018 06:17  Mg     2.1       25 Dec 2018 06:17                        11.0   7.78  )-----------( 151      ( 26 Dec 2018 07:23 )             36.0 ,                       10.5   8.91  )-----------( 136      ( 24 Dec 2018 07:48 )             32.8     PT/PTT- ( 26 Dec 2018 07:23 )   PT: 14.9 sec;  PTT: x         INR: 1.32 ratio (12-26 @ 07:23)  INR: 1.15 ratio (12-25 @ 06:55)  INR: 1.18 ratio (12-24 @ 14:31) Patient is a 63y old  Male who presents with a chief complaint of respiratory failure (26 Dec 2018 08:48)      PAST MEDICAL & SURGICAL HISTORY:  Diabetes  HTN (hypertension)  CAD (coronary artery disease)  CVA (cerebral vascular accident)  CHF  Asthma  Cardiac pacemaker      INTERVAL HISTORY: Resting in bed, not in any acute distress, denies any chest pain or sob    	    MEDICATIONS  (STANDING):  ALBUTerol    90 MICROgram(s) HFA Inhaler 1 Puff(s) Inhalation every 4 hours  ampicillin/sulbactam  IVPB 3 Gram(s) IV Intermittent every 6 hours  aspirin enteric coated 81 milliGRAM(s) Oral daily  atorvastatin 20 milliGRAM(s) Oral at bedtime  buDESOnide 160 MICROgram(s)/formoterol 4.5 MICROgram(s) Inhaler 2 Puff(s) Inhalation two times a day  carvedilol 12.5 milliGRAM(s) Oral every 12 hours  chlorhexidine 4% Liquid 1 Application(s) Topical <User Schedule>  enoxaparin Injectable 80 milliGRAM(s) SubCutaneous every 12 hours  folic acid 1 milliGRAM(s) Oral daily  furosemide    Tablet 20 milliGRAM(s) Oral daily  influenza   Vaccine 0.5 milliLiter(s) IntraMuscular once  insulin glargine Injectable (LANTUS) 15 Unit(s) SubCutaneous at bedtime  insulin lispro (HumaLOG) corrective regimen sliding scale   SubCutaneous Before meals and at bedtime  insulin lispro Injectable (HumaLOG) 4 Unit(s) SubCutaneous before breakfast  insulin lispro Injectable (HumaLOG) 4 Unit(s) SubCutaneous before lunch  insulin lispro Injectable (HumaLOG) 4 Unit(s) SubCutaneous before dinner  lisinopril 20 milliGRAM(s) Oral daily  predniSONE   Tablet 20 milliGRAM(s) Oral daily  thiamine 100 milliGRAM(s) Oral daily  tiotropium 18 MICROgram(s) Capsule 1 Capsule(s) Inhalation daily  warfarin 10 milliGRAM(s) Oral once    MEDICATIONS  (PRN):  dextrose 40% Gel 15 Gram(s) Oral once PRN Blood Glucose LESS THAN 70 milliGRAM(s)/deciliter  glucagon  Injectable 1 milliGRAM(s) IntraMuscular once PRN Glucose LESS THAN 70 milligrams/deciliter      Vitals:  T(F): 97.7 (12-26-18 @ 11:10), Max: 98.2 (12-26-18 @ 01:01)  HR: 61 (12-26-18 @ 11:10) (60 - 72)  BP: 163/88 (12-26-18 @ 11:10) (127/82 - 163/88)  RR: 16 (12-26-18 @ 11:10) (16 - 18)  SpO2: 98% (12-26-18 @ 11:10) (98% - 100%)  Wt(kg): -- 82. 4kg    12-25 @ 07:01  -  12-26 @ 07:00  --------------------------------------------------------  IN:    Oral Fluid: 250 mL  Total IN: 250 mL    OUT:    Voided: 400 mL  Total OUT: 400 mL    Total NET: -150 mL    PHYSICAL EXAM:  Neuro: Awake, responsive  CV: S1 S2 RRR  Lungs: CTABL  GI: Soft, BS +, ND, NT  Extremities: Left UE edema    RADIOLOGY: < from: CT Abdomen and Pelvis w/ Oral Cont and w/ IV Cont (12.23.18 @ 11:09) >  IMPRESSION:   1. Mild right pleural effusion.   2. Consolidation in both lower lobes may represent atelectasis or   pneumonia..   3. 18 mm nodule upper pole right kidney 25 Hounsfield units. Recommend   surgery.  4. Air in the bladder may reflect recent instrumentation or infection.    Small bilateral pleural effusions, larger on the right with mild   overlying atelectasis.    There are multiple renal cysts with largest cyst measuring up to   approximately 4 cm in each kidney. I contacted [facility] radiologist Marcy Harris. The 18 mm nodule upper pole right kidney that she is referring to   is the most superior lesion in the right kidney. This has the appearance   of a cyst and surgery is not recommended.    In addition, trace pericardial effusion is seen.    There is mild fatty infiltration of the liver.    Nonspecific trace fluid seen right upper abdomen and trace fluid right   lower quadrant at the unremarkable appearing appendix.    Small amount of air is noted in the bilateral anterior abdominal wall   soft tissues presumably iatrogenic, i.e. related to injection.    Degenerative changes L4-5. Small nonspecific lucency seen in the L2   vertebral body.    DIAGNOSTIC TESTING:    [x Echocardiogram:  < from: TTE Echo Doppler w/o Cont (12.21.18 @ 17:20) >   1. Left ventricular ejection fraction, by visual estimation, is 60 to   65%.   2. Technically fair study.   3. Normal global left ventricular systolic function.   4. Normal left ventricular internal cavity size.   5. Spectral Doppler shows impaired relaxation pattern of left   ventricular myocardial filling (Grade I diastolic dysfunction).   6. There is mild concentric left ventricular hypertrophy.   7. Normal right ventricular size and function.   8. There is no evidence of pericardial effusion.   9. Mild mitral annular calcification.  10. Mild mitral valve regurgitation.  11. Thickening and calcification of the anterior and posterior mitral   valve leaflets.  12. Mild tricuspid regurgitation.  13. Mild aortic regurgitation.  14. Sclerotic aortic valve with normal opening.  15. Pulmonic valve regurgitation.  16. The aortic valve mean gradient is 4.6 mmHg consistent with normally   opening aortic valve.  17. There is mild aortic root calcification.    < end of copied text >      LABS:	 	    25 Dec 2018 06:17    141    |  107    |  17     ----------------------------<  174    3.6     |  26     |  0.86   24 Dec 2018 07:48    141    |  109    |  17     ----------------------------<  189    3.7     |  26     |  0.85     Ca    8.0        25 Dec 2018 06:17  Phos  3.0       25 Dec 2018 06:17  Mg     2.1       25 Dec 2018 06:17                        11.0   7.78  )-----------( 151      ( 26 Dec 2018 07:23 )             36.0 ,                       10.5   8.91  )-----------( 136      ( 24 Dec 2018 07:48 )             32.8     PT/PTT- ( 26 Dec 2018 07:23 )   PT: 14.9 sec;  PTT: x         INR: 1.32 ratio (12-26 @ 07:23)  INR: 1.15 ratio (12-25 @ 06:55)  INR: 1.18 ratio (12-24 @ 14:31)

## 2018-12-26 NOTE — PROGRESS NOTE ADULT - SUBJECTIVE AND OBJECTIVE BOX
HPI:  63 years old male by ems s/p et intubated c/o respiratory arrest. EMS sts pt was in a restaurant c/o sob and ems noted pt has a neb pump next to him pt was sob, unable to talk. Pt here is et intubated and breath sounds + expiratory wheezing bilaterally. Pt's wife is here at bed side now states pt had asthma attack one week ago was discharge from hospital with steroids  Recent history of PPM/AICD implantation at [facility] as per family. Patient seen and evaluated at bedside with ICU attending. CT head negative for acute infarct or bleed. CTA done  No CT evidence of pulmonary embolism. full report in chart. (20 Dec 2018 18:25)      Allergies    No Known Allergies    Intolerances        MEDICATIONS  (STANDING):  ALBUTerol    90 MICROgram(s) HFA Inhaler 1 Puff(s) Inhalation every 4 hours  ampicillin/sulbactam  IVPB 3 Gram(s) IV Intermittent every 6 hours  aspirin enteric coated 81 milliGRAM(s) Oral daily  atorvastatin 20 milliGRAM(s) Oral at bedtime  buDESOnide 160 MICROgram(s)/formoterol 4.5 MICROgram(s) Inhaler 2 Puff(s) Inhalation two times a day  carvedilol 12.5 milliGRAM(s) Oral every 12 hours  chlorhexidine 4% Liquid 1 Application(s) Topical <User Schedule>  dextrose 5%. 1000 milliLiter(s) (50 mL/Hr) IV Continuous <Continuous>  dextrose 50% Injectable 12.5 Gram(s) IV Push once  dextrose 50% Injectable 25 Gram(s) IV Push once  dextrose 50% Injectable 25 Gram(s) IV Push once  enoxaparin Injectable 80 milliGRAM(s) SubCutaneous every 12 hours  folic acid 1 milliGRAM(s) Oral daily  furosemide    Tablet 20 milliGRAM(s) Oral daily  influenza   Vaccine 0.5 milliLiter(s) IntraMuscular once  insulin glargine Injectable (LANTUS) 15 Unit(s) SubCutaneous at bedtime  insulin lispro (HumaLOG) corrective regimen sliding scale   SubCutaneous Before meals and at bedtime  insulin lispro Injectable (HumaLOG) 4 Unit(s) SubCutaneous before breakfast  insulin lispro Injectable (HumaLOG) 4 Unit(s) SubCutaneous before lunch  insulin lispro Injectable (HumaLOG) 4 Unit(s) SubCutaneous before dinner  lisinopril 20 milliGRAM(s) Oral daily  predniSONE   Tablet 20 milliGRAM(s) Oral daily  thiamine 100 milliGRAM(s) Oral daily  tiotropium 18 MICROgram(s) Capsule 1 Capsule(s) Inhalation daily    MEDICATIONS  (PRN):  dextrose 40% Gel 15 Gram(s) Oral once PRN Blood Glucose LESS THAN 70 milliGRAM(s)/deciliter  glucagon  Injectable 1 milliGRAM(s) IntraMuscular once PRN Glucose LESS THAN 70 milligrams/deciliter      REVIEW OF SYSTEMS:    CONSTITUTIONAL: No fever, chills, weight loss, or fatigue  HEENT: No sore throat, runny nose, ear ache  RESPIRATORY: No cough, wheezing, No shortness of breath  CARDIOVASCULAR: No chest pain, palpitations, dizziness  GASTROINTESTINAL: No abdominal pain. No nausea, vomiting, diarrhea  GENITOURINARY: No dysuria, increase frequency, hematuria, or incontinence  NEUROLOGICAL: No headaches, memory loss, loss of strength, numbness, or tremors, no weakness  EXTREMITY: No pedal edema BLE  SKIN: No itching, burning, rashes, or lesions     VITAL SIGNS:  T(C): 36.7 (12-26-18 @ 17:15), Max: 36.8 (12-26-18 @ 01:01)  T(F): 98 (12-26-18 @ 17:15), Max: 98.2 (12-26-18 @ 01:01)  HR: 79 (12-26-18 @ 17:15) (60 - 79)  BP: 119/79 (12-26-18 @ 17:15) (119/79 - 163/88)  RR: 16 (12-26-18 @ 17:15) (16 - 18)  SpO2: 100% (12-26-18 @ 17:15) (98% - 100%)  Wt(kg): --    PHYSICAL EXAM:    GENERAL: not in any distress  HEENT: Neck is supple, normocephalic, atraumatic   CHEST/LUNG: Clear to percussion bilaterally; No rales, rhonchi, wheezing  HEART: Regular rate and rhythm; No murmurs, rubs, or gallops  ABDOMEN: Soft, Nontender, Nondistended; Bowel sounds present, no rebound   EXTREMITIES:  2+ Peripheral Pulses, No clubbing, cyanosis, or edema  GENITOURINARY:   SKIN: No rashes or lesions  BACK: no pressor sore   NERVOUS SYSTEM:  Alert   LABS:                         11.0   7.78  )-----------( 151      ( 26 Dec 2018 07:23 )             36.0     12-25    141  |  107  |  17  ----------------------------<  174<H>  3.6   |  26  |  0.86    Ca    8.0<L>      25 Dec 2018 06:17  Phos  3.0     12-25  Mg     2.1     12-25        PT/INR - ( 26 Dec 2018 07:23 )   PT: 14.9 sec;   INR: 1.32 ratio                                 Culture Results:   No growth at 5 days. (12-21 @ 15:26)  Culture Results:   Growth in aerobic bottle:  Enterococcus faecium  Pantoea agglomerans  Acinetobacter baumannii Complex  Growth in anaerobic bottle: Coag Negative Staphylococcus  Single set isolate, possible contaminant. Contact  Microbiology if susceptibility testing clinically  indicated.  "Due to technical problems, Proteus sp. will Not be reported as part of  the BCID panel until further notice"  ***Blood Panel PCR results on this specimen are available  approximately 3 hours after the Gram stain result.***  Gram stain, PCR, and/or culture results may not always  correspond due to difference in methodologies.  ************************************************************  This PCR assay was performed using [employer].  The following targets are tested for: Enterococcus,  vancomycin resistant enterococci, Listeria monocytogenes,  coagulase negative staphylococci, S. aureus,  methicillin resistant S. aureus, Streptococcus agalactiae  (Group B), S. pneumoniae, S. pyogenes (Group A),  Acinetobacterbaumannii, Enterobacter cloacae, E. coli,  Klebsiella oxytoca, K. pneumoniae, Proteus sp.,  Serratia marcescens, Haemophilus influenzae,  Neisseria meningitidis, Pseudomonas aeruginosa, Candida  albicans, C. glabrata, C krusei, C parapsilosis,  C. tropicalis and the KPC resistance gene. (12-20 @ 19:02)  Culture Results:   Growth in aerobic bottle: Staphylococcus capitis "Susceptibilities not  performed"  "Due to technical problems, Proteus sp. will Not be reported as part of  the BCID panel until further notice"  ***Blood Panel PCR results on this specimen are available  approximately 3 hours after the Gram stain result.***  Gram stain, PCR, and/or culture results may not always  correspond due to difference in methodologies.  ************************************************************  This PCR assay was performed using [employer].  The following targets are tested for: Enterococcus,  vancomycin resistant enterococci, Listeria monocytogenes,  coagulase negative staphylococci, S. aureus,  methicillin resistant S. aureus, Streptococcus agalactiae  (Group B), S. pneumoniae, S. pyogenes (Group A),  Acinetobacter baumannii, Enterobacter cloacae, E. coli,  Klebsiella oxytoca, K. pneumoniae, Proteus sp.,  Serratia marcescens, Haemophilus influenzae,  Neisseria meningitidis, Pseudomonas aeruginosa, Candida  albicans, C. glabrata, C krusei, C parapsilosis,  C. tropicalis and the KPC resistance gene. (12-20 @ 19:02)  Culture Results:   No growth (12-20 @ 18:36)                Radiology:

## 2018-12-26 NOTE — PROGRESS NOTE ADULT - PROBLEM SELECTOR PLAN 5
cont therapeutic Lovenox for now.   Xarelto and eliquis too expensive and not covered by his medicaid.    started coumadin 10mg on 12/24/18 INR 1.32 on today .      continue with lovenox as well. cont w/ same mgmt

## 2018-12-26 NOTE — PROGRESS NOTE ADULT - ASSESSMENT
63 years old male brought in by ems s/p intubation c/o respiratory arrest. He had asthma attack one week ago was discharged from hospital with steroids.  Recent history of PPM implantation at [facility]. Chronic diastolic HF, CVA Hx, CAD hx  CT head negative for acute infarct or bleed.   CTA done  No CT evidence of pulmonary embolism.  Positive deep vein thrombus within proximal left arm basilic veins and nonocclusive focal clot seen within the mid subclavian vein.  extubated on 12/21, being managed on medical floor now  blood culture: Growth in aerobic bottle: Gram Positive Cocci in Clusters   Echo: EF 60-65%, mild MR/AR/TR, grade I DD    Plan    - c/w inhalers/supplemental O2/steroid  -Abx for bacteremia  -AC for DVT, bridging with full dose Lovenox to coumadin   -c/w asa/statin  -c/w carvedilol/lisinopril/low dose Lasix  -Diabetic management   -keep negative balance, monitor electrolytes /renal function   -Increase activity/ PT 63 years old male brought in by ems s/p intubation c/o respiratory arrest. He had asthma attack one week ago was discharged from hospital with steroids.  Recent history of PPM implantation at [facility]. Chronic diastolic HF, CVA Hx, CAD hx  CT head negative for acute infarct or bleed.   CTA done  No CT evidence of pulmonary embolism.  Positive deep vein thrombus within proximal left arm basilic veins and nonocclusive focal clot seen within the mid subclavian vein.  extubated on 12/21, being managed on medical floor now  blood culture: Growth in aerobic bottle: Gram Positive Cocci in Clusters   Echo: EF 60-65%, mild MR/AR/TR, grade I DD    Plan    - c/w inhalers/supplemental O2/steroid  -Abx for bacteremia  -AC for DVT, bridging with full dose Lovenox to coumadin   -c/w asa/statin  -c/w carvedilol/lisinopril/low dose Lasix  -Diabetic management   -keep negative balance, monitor electrolytes /renal function   -Increase activity/ PT  -pt follow up with cardiology at [facility]   -cardiac status otherwise stable, will sign off for no, please reconsult as needed

## 2018-12-26 NOTE — PROGRESS NOTE ADULT - PROBLEM SELECTOR PLAN 6
cont w/ same mgmt cont w/ Lantus and prandial Humalog  FS monitoring with insulin s/s coverage.   hgba1c at 10.1

## 2018-12-26 NOTE — PROGRESS NOTE ADULT - PROBLEM SELECTOR PLAN 7
cont w/ Lantus and prandial Humalog  FS monitoring with insulin s/s coverage.   hgba1c at 10.1 h/o CVA cont aspirin and statin

## 2018-12-26 NOTE — PROGRESS NOTE ADULT - ASSESSMENT
S/P Hypoxic Respiratory failure.  Acute COPD exacerbation.  Atelectasis/Pneumonia.    benzos in system ?? from ??   blood culture ; single set anerobic cns;; aerobic e faecium and acinetobacter all sensitive to ampicillin   likely contaminants , unclear source   CT abd noted no impressive infection , clinically ok  incidental renal cysts noted .     cardiac follow up ?? ac resp failure from cardiac issues   will continue Augmentin 500 q 8 hr for 2 weeks from 12/21, when first culture negative. END DATE 1/4/2019.   THANKS

## 2018-12-26 NOTE — PROGRESS NOTE ADULT - SUBJECTIVE AND OBJECTIVE BOX
Patient is a 63y old  Male who presents with a chief complaint of respiratory failure (22 Dec 2018 19:46)      INTERVAL HPI/ OVERNIGHT EVENTS: Pt was seen and examined at bedside today, No significant overnight events, pt denies SOB or wheezing.         MEDICATIONS  (STANDING):  ALBUTerol    90 MICROgram(s) HFA Inhaler 1 Puff(s) Inhalation every 4 hours  ampicillin/sulbactam  IVPB 3 Gram(s) IV Intermittent every 6 hours  aspirin enteric coated 81 milliGRAM(s) Oral daily  atorvastatin 20 milliGRAM(s) Oral at bedtime  buDESOnide 160 MICROgram(s)/formoterol 4.5 MICROgram(s) Inhaler 2 Puff(s) Inhalation two times a day  carvedilol 12.5 milliGRAM(s) Oral every 12 hours  chlorhexidine 4% Liquid 1 Application(s) Topical <User Schedule>  dextrose 5%. 1000 milliLiter(s) (50 mL/Hr) IV Continuous <Continuous>  dextrose 50% Injectable 12.5 Gram(s) IV Push once  dextrose 50% Injectable 25 Gram(s) IV Push once  dextrose 50% Injectable 25 Gram(s) IV Push once  enoxaparin Injectable 80 milliGRAM(s) SubCutaneous every 12 hours  folic acid 1 milliGRAM(s) Oral daily  furosemide    Tablet 20 milliGRAM(s) Oral daily  influenza   Vaccine 0.5 milliLiter(s) IntraMuscular once  insulin glargine Injectable (LANTUS) 15 Unit(s) SubCutaneous at bedtime  insulin lispro (HumaLOG) corrective regimen sliding scale   SubCutaneous Before meals and at bedtime  insulin lispro Injectable (HumaLOG) 4 Unit(s) SubCutaneous before breakfast  insulin lispro Injectable (HumaLOG) 4 Unit(s) SubCutaneous before lunch  insulin lispro Injectable (HumaLOG) 4 Unit(s) SubCutaneous before dinner  lisinopril 20 milliGRAM(s) Oral daily  predniSONE   Tablet 30 milliGRAM(s) Oral daily  thiamine 100 milliGRAM(s) Oral daily  tiotropium 18 MICROgram(s) Capsule 1 Capsule(s) Inhalation daily    MEDICATIONS  (PRN):  dextrose 40% Gel 15 Gram(s) Oral once PRN Blood Glucose LESS THAN 70 milliGRAM(s)/deciliter  glucagon  Injectable 1 milliGRAM(s) IntraMuscular once PRN Glucose LESS THAN 70 milligrams/deciliter    Intolerances    Vital Signs Last 24 Hrs  T(C): 36.4 (26 Dec 2018 05:13), Max: 36.8 (26 Dec 2018 01:01)  T(F): 97.6 (26 Dec 2018 05:13), Max: 98.2 (26 Dec 2018 01:01)  HR: 60 (26 Dec 2018 05:13) (60 - 72)  BP: 154/95 (26 Dec 2018 05:13) (125/79 - 154/95)  BP(mean): --  RR: 18 (26 Dec 2018 05:13) (16 - 18)  SpO2: 100% (26 Dec 2018 05:13) (99% - 100%)    PHYSICAL EXAM:  GENERAL: NAD, well-developed, well-groomed  HEAD:  Atraumatic, Normocephalic  EYES: conjunctiva and sclera clear  ENMT: Moist mucous membranes  NECK: Supple, No JVD, Normal thyroid  CHEST/LUNG: Clear to Auscultation bilaterally; No rales, rhonchi, wheezing, or rubs  HEART: Regular rate and rhythm; No murmurs, rubs, or gallops  ABDOMEN: Soft, Nontender, Nondistended; Bowel sounds present  EXTREMITIES:  2+ Peripheral Pulses, No clubbing, cyanosis, or edema  SKIN: No rashes or lesions  NERVOUS SYSTEM:  Alert & Oriented X3, Good concentration; Motor Strength 5/5 B/L upper and lower extremities    LABS:                                    11.0   7.78  )-----------( 151      ( 26 Dec 2018 07:23 )  12-25    141  |  107  |  17  ----------------------------<  174<H>  3.6   |  26  |  0.86    Ca    8.0<L>      25 Dec 2018 06:17  Phos  3.0     12-25  Mg     2.1     12-25           PT/INR - ( 26 Dec 2018 07:23 )   PT: 14.9 sec;   INR: 1.32 ratio                   Hemoglobin A1C, Whole Blood (12.21.18 @ 09:06)    Hemoglobin A1C, Whole Blood: 10.1: Method: Immunoassay       Reference Range                4.0-5.6%       High risk (prediabetic)        5.7-6.4%       Diabetic, diagnostic             >=6.5%       ADA diabetic treatment goal       <7.0%  The Hemoglobin A1c testing is NGSP-certified.Reference ranges are based  upon the 2010 recommendations of  the American Diabetes Association.  Interpretation may vary for children  and adolescents. %          CAPILLARY BLOOD GLUCOSE    CAPILLARY BLOOD GLUCOSE      POCT Blood Glucose.: 231 mg/dL (23 Dec 2018 21:34)  POCT Blood Glucose.: 260 mg/dL (23 Dec 2018 16:06)  POCT Blood Glucose.: 130 mg/dL (23 Dec 2018 11:27)          RADIOLOGY & ADDITIONAL TESTS:  < from: CT Abdomen and Pelvis w/ Oral Cont and w/ IV Cont (12.23.18 @ 11:09) >    IMPRESSION:   1. Mild right pleural effusion.   2. Consolidation in both lower lobes may represent atelectasis or   pneumonia..   3. 18 mm nodule upper pole right kidney 25 Hounsfield units. Recommend   surgery.  4. Air in the bladder may reflect recent instrumentation or infection.        Addendum:      Small bilateral pleural effusions, larger on the right with mild   overlying atelectasis.    There are multiple renal cysts with largest cyst measuring up to   approximately 4 cm in each kidney. I contacted [facility] radiologist Marcy Harris. The 18 mm nodule upper pole right kidney that she is referring to   is the most superior lesion in the right kidney. This has the appearance   of a cyst and surgery is not recommended.    In addition, trace pericardial effusion is seen.    There is mild fatty infiltration of the liver.    Nonspecific trace fluid seen right upper abdomen and trace fluid right   lower quadrant at the unremarkable appearing appendix.    Small amount of air is noted in the bilateral anterior abdominal wall   soft tissues presumably iatrogenic, i.e. related to injection.    Degenerative changes L4-5. Small nonspecific lucency seen in the L2   vertebral body.    Other findings as described in the [facility] report.      < end of copied text >          Imaging Personally Reviewed:  [ ] YES  [ ] NO    Consultant(s) Notes Reviewed:  [x ] YES  [ ] NO    Care Discussed with Consultants/Other Providers [x ] YES  [ ] NO

## 2018-12-27 LAB
GLUCOSE BLDC GLUCOMTR-MCNC: 143 MG/DL — HIGH (ref 70–99)
GLUCOSE BLDC GLUCOMTR-MCNC: 208 MG/DL — HIGH (ref 70–99)
GLUCOSE BLDC GLUCOMTR-MCNC: 230 MG/DL — HIGH (ref 70–99)
GLUCOSE BLDC GLUCOMTR-MCNC: 308 MG/DL — HIGH (ref 70–99)
INR BLD: 1.71 RATIO — HIGH (ref 0.88–1.16)
PROTHROM AB SERPL-ACNC: 19.5 SEC — HIGH (ref 10–12.9)

## 2018-12-27 PROCEDURE — 99232 SBSQ HOSP IP/OBS MODERATE 35: CPT

## 2018-12-27 RX ORDER — WARFARIN SODIUM 2.5 MG/1
7.5 TABLET ORAL ONCE
Qty: 0 | Refills: 0 | Status: COMPLETED | OUTPATIENT
Start: 2018-12-27 | End: 2018-12-27

## 2018-12-27 RX ADMIN — ALBUTEROL 1 PUFF(S): 90 AEROSOL, METERED ORAL at 10:38

## 2018-12-27 RX ADMIN — ALBUTEROL 1 PUFF(S): 90 AEROSOL, METERED ORAL at 02:15

## 2018-12-27 RX ADMIN — Medication 100 MILLIGRAM(S): at 12:54

## 2018-12-27 RX ADMIN — Medication 4: at 16:36

## 2018-12-27 RX ADMIN — Medication 4: at 22:14

## 2018-12-27 RX ADMIN — ALBUTEROL 1 PUFF(S): 90 AEROSOL, METERED ORAL at 22:11

## 2018-12-27 RX ADMIN — Medication 4 UNIT(S): at 08:14

## 2018-12-27 RX ADMIN — Medication 81 MILLIGRAM(S): at 11:32

## 2018-12-27 RX ADMIN — ALBUTEROL 1 PUFF(S): 90 AEROSOL, METERED ORAL at 18:10

## 2018-12-27 RX ADMIN — Medication 20 MILLIGRAM(S): at 05:53

## 2018-12-27 RX ADMIN — AMPICILLIN SODIUM AND SULBACTAM SODIUM 200 GRAM(S): 250; 125 INJECTION, POWDER, FOR SUSPENSION INTRAMUSCULAR; INTRAVENOUS at 05:53

## 2018-12-27 RX ADMIN — LISINOPRIL 20 MILLIGRAM(S): 2.5 TABLET ORAL at 05:53

## 2018-12-27 RX ADMIN — ATORVASTATIN CALCIUM 20 MILLIGRAM(S): 80 TABLET, FILM COATED ORAL at 22:13

## 2018-12-27 RX ADMIN — WARFARIN SODIUM 7.5 MILLIGRAM(S): 2.5 TABLET ORAL at 22:10

## 2018-12-27 RX ADMIN — Medication 1 MILLIGRAM(S): at 11:32

## 2018-12-27 RX ADMIN — Medication 4 UNIT(S): at 16:37

## 2018-12-27 RX ADMIN — ENOXAPARIN SODIUM 80 MILLIGRAM(S): 100 INJECTION SUBCUTANEOUS at 18:11

## 2018-12-27 RX ADMIN — CARVEDILOL PHOSPHATE 12.5 MILLIGRAM(S): 80 CAPSULE, EXTENDED RELEASE ORAL at 05:54

## 2018-12-27 RX ADMIN — AMPICILLIN SODIUM AND SULBACTAM SODIUM 200 GRAM(S): 250; 125 INJECTION, POWDER, FOR SUSPENSION INTRAMUSCULAR; INTRAVENOUS at 18:11

## 2018-12-27 RX ADMIN — BUDESONIDE AND FORMOTEROL FUMARATE DIHYDRATE 2 PUFF(S): 160; 4.5 AEROSOL RESPIRATORY (INHALATION) at 05:53

## 2018-12-27 RX ADMIN — CARVEDILOL PHOSPHATE 12.5 MILLIGRAM(S): 80 CAPSULE, EXTENDED RELEASE ORAL at 18:13

## 2018-12-27 RX ADMIN — Medication 20 MILLIGRAM(S): at 05:55

## 2018-12-27 RX ADMIN — AMPICILLIN SODIUM AND SULBACTAM SODIUM 200 GRAM(S): 250; 125 INJECTION, POWDER, FOR SUSPENSION INTRAMUSCULAR; INTRAVENOUS at 12:54

## 2018-12-27 RX ADMIN — INSULIN GLARGINE 15 UNIT(S): 100 INJECTION, SOLUTION SUBCUTANEOUS at 22:13

## 2018-12-27 RX ADMIN — Medication 4 UNIT(S): at 11:23

## 2018-12-27 RX ADMIN — ENOXAPARIN SODIUM 80 MILLIGRAM(S): 100 INJECTION SUBCUTANEOUS at 05:54

## 2018-12-27 RX ADMIN — BUDESONIDE AND FORMOTEROL FUMARATE DIHYDRATE 2 PUFF(S): 160; 4.5 AEROSOL RESPIRATORY (INHALATION) at 18:12

## 2018-12-27 RX ADMIN — Medication 8: at 08:14

## 2018-12-27 RX ADMIN — ALBUTEROL 1 PUFF(S): 90 AEROSOL, METERED ORAL at 05:53

## 2018-12-27 RX ADMIN — TIOTROPIUM BROMIDE 1 CAPSULE(S): 18 CAPSULE ORAL; RESPIRATORY (INHALATION) at 11:27

## 2018-12-27 RX ADMIN — ALBUTEROL 1 PUFF(S): 90 AEROSOL, METERED ORAL at 13:15

## 2018-12-27 NOTE — PROGRESS NOTE ADULT - PROBLEM SELECTOR PLAN 2
acute exacerbation resolved.   Pulmonary consult appreciated.   cont w/ tapering steroids, spiriva duonebs. prn.
acute exacerbation resolved.   Pulmonary consult appreciated.   cont w/ tapering steroids, spiriva duonebs. prn.
acute exacerbation resolved.   cont w/ tapering steroids, spiriva duonebs. prn.   was cleared by pulmonary for d.c on 12/24/18
acute exacerbation resolved.   Pulmonary consult appreciated.   cont w/ tapering steroids, spiriva duonebs. prn.
acute exacerbation resolved.   cont w/ tapering steroids, spiriva duonebs. prn.   was cleared by pulmonary for d.c on 12/24/18

## 2018-12-27 NOTE — PROGRESS NOTE ADULT - PROBLEM SELECTOR PROBLEM 2
Asthma, unspecified asthma severity, unspecified whether complicated, unspecified whether persistent
Asthma with acute exacerbation, unspecified asthma severity, unspecified whether persistent

## 2018-12-27 NOTE — PROGRESS NOTE ADULT - SUBJECTIVE AND OBJECTIVE BOX
HPI:  63 years old male by ems s/p et intubated c/o respiratory arrest. EMS sts pt was in a restaurant c/o sob and ems noted pt has a neb pump next to him pt was sob, unable to talk. Pt here is et intubated and breath sounds + expiratory wheezing bilaterally. Pt's wife is here at bed side now states pt had asthma attack one week ago was discharge from hospital with steroids  Recent history of PPM/AICD implantation at [facility] as per family. Patient seen and evaluated at bedside with ICU attending. CT head negative for acute infarct or bleed. CTA done  No CT evidence of pulmonary embolism. full report in chart. (20 Dec 2018 18:25)      Allergies    No Known Allergies    Intolerances        MEDICATIONS  (STANDING):  ALBUTerol    90 MICROgram(s) HFA Inhaler 1 Puff(s) Inhalation every 4 hours  ampicillin/sulbactam  IVPB 3 Gram(s) IV Intermittent every 6 hours  aspirin enteric coated 81 milliGRAM(s) Oral daily  atorvastatin 20 milliGRAM(s) Oral at bedtime  buDESOnide 160 MICROgram(s)/formoterol 4.5 MICROgram(s) Inhaler 2 Puff(s) Inhalation two times a day  carvedilol 12.5 milliGRAM(s) Oral every 12 hours  chlorhexidine 4% Liquid 1 Application(s) Topical <User Schedule>  dextrose 5%. 1000 milliLiter(s) (50 mL/Hr) IV Continuous <Continuous>  dextrose 50% Injectable 12.5 Gram(s) IV Push once  dextrose 50% Injectable 25 Gram(s) IV Push once  dextrose 50% Injectable 25 Gram(s) IV Push once  enoxaparin Injectable 80 milliGRAM(s) SubCutaneous every 12 hours  folic acid 1 milliGRAM(s) Oral daily  furosemide    Tablet 20 milliGRAM(s) Oral daily  influenza   Vaccine 0.5 milliLiter(s) IntraMuscular once  insulin glargine Injectable (LANTUS) 15 Unit(s) SubCutaneous at bedtime  insulin lispro (HumaLOG) corrective regimen sliding scale   SubCutaneous Before meals and at bedtime  insulin lispro Injectable (HumaLOG) 4 Unit(s) SubCutaneous before breakfast  insulin lispro Injectable (HumaLOG) 4 Unit(s) SubCutaneous before lunch  insulin lispro Injectable (HumaLOG) 4 Unit(s) SubCutaneous before dinner  lisinopril 20 milliGRAM(s) Oral daily  predniSONE   Tablet 20 milliGRAM(s) Oral daily  thiamine 100 milliGRAM(s) Oral daily  tiotropium 18 MICROgram(s) Capsule 1 Capsule(s) Inhalation daily  warfarin 7.5 milliGRAM(s) Oral once    MEDICATIONS  (PRN):  dextrose 40% Gel 15 Gram(s) Oral once PRN Blood Glucose LESS THAN 70 milliGRAM(s)/deciliter  glucagon  Injectable 1 milliGRAM(s) IntraMuscular once PRN Glucose LESS THAN 70 milligrams/deciliter      REVIEW OF SYSTEMS:    CONSTITUTIONAL: No fever, chills, weight loss, or fatigue  HEENT: No sore throat, runny nose, ear ache  RESPIRATORY: No cough, wheezing, No shortness of breath  CARDIOVASCULAR: No chest pain, palpitations, dizziness  GASTROINTESTINAL: No abdominal pain. No nausea, vomiting, diarrhea  GENITOURINARY: No dysuria, increase frequency, hematuria, or incontinence  NEUROLOGICAL: No headaches, memory loss, loss of strength, numbness, or tremors, no weakness  EXTREMITY: No pedal edema BLE  SKIN: No itching, burning, rashes, or lesions     VITAL SIGNS:  T(C): 36.6 (12-27-18 @ 05:44), Max: 36.7 (12-26-18 @ 17:15)  T(F): 97.9 (12-27-18 @ 05:44), Max: 98 (12-26-18 @ 17:15)  HR: 64 (12-27-18 @ 05:44) (60 - 79)  BP: 145/88 (12-27-18 @ 05:44) (119/79 - 163/88)  RR: 16 (12-27-18 @ 05:44) (16 - 16)  SpO2: 98% (12-27-18 @ 05:44) (98% - 100%)  Wt(kg): --    PHYSICAL EXAM:    GENERAL: not in any distress  HEENT: Neck is supple, normocephalic, atraumatic   CHEST/LUNG: Clear to percussion bilaterally; No rales, rhonchi, wheezing  HEART: Regular rate and rhythm; No murmurs, rubs, or gallops  ABDOMEN: Soft, Nontender, Nondistended; Bowel sounds present, no rebound   EXTREMITIES:  2+ Peripheral Pulses, No clubbing, cyanosis, or edema  GENITOURINARY:   SKIN: No rashes or lesions  BACK: no pressor sore   NERVOUS SYSTEM:  Alert & Oriented X3, Good concentration  PSYCH: normal affect     LABS:                         11.0   7.78  )-----------( 151      ( 26 Dec 2018 07:23 )             36.0             PT/INR - ( 27 Dec 2018 07:26 )   PT: 19.5 sec;   INR: 1.71 ratio                                 Culture Results:   No growth at 5 days. (12-21 @ 15:26)  Culture Results:   Growth in aerobic bottle:  Enterococcus faecium  Pantoea agglomerans  Acinetobacter baumannii Complex  Growth in anaerobic bottle: Coag Negative Staphylococcus  Single set isolate, possible contaminant. Contact  Microbiology if susceptibility testing clinically  indicated.  "Due to technical problems, Proteus sp. will Not be reported as part of  the BCID panel until further notice"  ***Blood Panel PCR results on this specimen are available  approximately 3 hours after the Gram stain result.***  Gram stain, PCR, and/or culture results may not always  correspond due to difference in methodologies.  ************************************************************  This PCR assay was performed using [employer].  The following targets are tested for: Enterococcus,  vancomycin resistant enterococci, Listeria monocytogenes,  coagulase negative staphylococci, S. aureus,  methicillin resistant S. aureus, Streptococcus agalactiae  (Group B), S. pneumoniae, S. pyogenes (Group A),  Acinetobacterbaumannii, Enterobacter cloacae, E. coli,  Klebsiella oxytoca, K. pneumoniae, Proteus sp.,  Serratia marcescens, Haemophilus influenzae,  Neisseria meningitidis, Pseudomonas aeruginosa, Candida  albicans, C. glabrata, C krusei, C parapsilosis,  C. tropicalis and the KPC resistance gene. (12-20 @ 19:02)  Culture Results:   Growth in aerobic bottle: Staphylococcus capitis "Susceptibilities not  performed"  "Due to technical problems, Proteus sp. will Not be reported as part of  the BCID panel until further notice"  ***Blood Panel PCR results on this specimen are available  approximately 3 hours after the Gram stain result.***  Gram stain, PCR, and/or culture results may not always  correspond due to difference in methodologies.  ************************************************************  This PCR assay was performed using [employer].  The following targets are tested for: Enterococcus,  vancomycin resistant enterococci, Listeria monocytogenes,  coagulase negative staphylococci, S. aureus,  methicillin resistant S. aureus, Streptococcus agalactiae  (Group B), S. pneumoniae, S. pyogenes (Group A),  Acinetobacter baumannii, Enterobacter cloacae, E. coli,  Klebsiella oxytoca, K. pneumoniae, Proteus sp.,  Serratia marcescens, Haemophilus influenzae,  Neisseria meningitidis, Pseudomonas aeruginosa, Candida  albicans, C. glabrata, C krusei, C parapsilosis,  C. tropicalis and the KPC resistance gene. (12-20 @ 19:02)  Culture Results:   No growth (12-20 @ 18:36)                Radiology:

## 2018-12-27 NOTE — PROGRESS NOTE ADULT - SUBJECTIVE AND OBJECTIVE BOX
Patient is a 63y old  Male who presents with a chief complaint of respiratory failure (22 Dec 2018 19:46)      INTERVAL HPI/ OVERNIGHT EVENTS: Pt was seen and examined at bedside today, No significant overnight events, pt denies SOB or wheezing.       MEDICATIONS  (STANDING):  ALBUTerol    90 MICROgram(s) HFA Inhaler 1 Puff(s) Inhalation every 4 hours  ampicillin/sulbactam  IVPB 3 Gram(s) IV Intermittent every 6 hours  aspirin enteric coated 81 milliGRAM(s) Oral daily  atorvastatin 20 milliGRAM(s) Oral at bedtime  buDESOnide 160 MICROgram(s)/formoterol 4.5 MICROgram(s) Inhaler 2 Puff(s) Inhalation two times a day  carvedilol 12.5 milliGRAM(s) Oral every 12 hours  chlorhexidine 4% Liquid 1 Application(s) Topical <User Schedule>  dextrose 5%. 1000 milliLiter(s) (50 mL/Hr) IV Continuous <Continuous>  dextrose 50% Injectable 12.5 Gram(s) IV Push once  dextrose 50% Injectable 25 Gram(s) IV Push once  dextrose 50% Injectable 25 Gram(s) IV Push once  enoxaparin Injectable 80 milliGRAM(s) SubCutaneous every 12 hours  folic acid 1 milliGRAM(s) Oral daily  furosemide    Tablet 20 milliGRAM(s) Oral daily  influenza   Vaccine 0.5 milliLiter(s) IntraMuscular once  insulin glargine Injectable (LANTUS) 15 Unit(s) SubCutaneous at bedtime  insulin lispro (HumaLOG) corrective regimen sliding scale   SubCutaneous Before meals and at bedtime  insulin lispro Injectable (HumaLOG) 4 Unit(s) SubCutaneous before breakfast  insulin lispro Injectable (HumaLOG) 4 Unit(s) SubCutaneous before lunch  insulin lispro Injectable (HumaLOG) 4 Unit(s) SubCutaneous before dinner  lisinopril 20 milliGRAM(s) Oral daily  predniSONE   Tablet 20 milliGRAM(s) Oral daily  thiamine 100 milliGRAM(s) Oral daily  tiotropium 18 MICROgram(s) Capsule 1 Capsule(s) Inhalation daily  warfarin 7.5 milliGRAM(s) Oral once    MEDICATIONS  (PRN):  dextrose 40% Gel 15 Gram(s) Oral once PRN Blood Glucose LESS THAN 70 milliGRAM(s)/deciliter  glucagon  Injectable 1 milliGRAM(s) IntraMuscular once PRN Glucose LESS THAN 70 milligrams/deciliter  Intolerances    Vital Signs Last 24 Hrs  T(C): 36.6 (27 Dec 2018 05:44), Max: 36.7 (26 Dec 2018 17:15)  T(F): 97.9 (27 Dec 2018 05:44), Max: 98 (26 Dec 2018 17:15)  HR: 64 (27 Dec 2018 05:44) (60 - 79)  BP: 145/88 (27 Dec 2018 05:44) (119/79 - 163/88)  BP(mean): --  RR: 16 (27 Dec 2018 05:44) (16 - 16)  SpO2: 98% (27 Dec 2018 05:44) (98% - 100%)      PHYSICAL EXAM:  GENERAL: NAD, well-developed, well-groomed  HEAD:  Atraumatic, Normocephalic  EYES: conjunctiva and sclera clear  ENMT: Moist mucous membranes  NECK: Supple, No JVD, Normal thyroid  CHEST/LUNG: Clear to Auscultation bilaterally; No rales, rhonchi, wheezing, or rubs  HEART: Regular rate and rhythm; No murmurs, rubs, or gallops  ABDOMEN: Soft, Nontender, Nondistended; Bowel sounds present  EXTREMITIES:  2+ Peripheral Pulses, No clubbing, cyanosis, or edema  SKIN: No rashes or lesions  NERVOUS SYSTEM:  Alert & Oriented X3, Good concentration; Motor Strength 5/5 B/L upper and lower extremities    LABS:                                             11.0   7.78  )-----------( 151      ( 26 Dec 2018 07:23 )             36.0                Hemoglobin A1C, Whole Blood (12.21.18 @ 09:06)    Hemoglobin A1C, Whole Blood: 10.1: Method: Immunoassay       Reference Range                4.0-5.6%       High risk (prediabetic)        5.7-6.4%       Diabetic, diagnostic             >=6.5%       ADA diabetic treatment goal       <7.0%  The Hemoglobin A1c testing is NGSP-certified.Reference ranges are based  upon the 2010 recommendations of  the American Diabetes Association.  Interpretation may vary for children  and adolescents. %          CAPILLARY BLOOD GLUCOSE      POCT Blood Glucose.: 308 mg/dL (27 Dec 2018 08:10)  POCT Blood Glucose.: 178 mg/dL (26 Dec 2018 21:29)  POCT Blood Glucose.: 275 mg/dL (26 Dec 2018 15:28)  POCT Blood Glucose.: 298 mg/dL (26 Dec 2018 11:00)          RADIOLOGY & ADDITIONAL TESTS:  < from: CT Abdomen and Pelvis w/ Oral Cont and w/ IV Cont (12.23.18 @ 11:09) >    IMPRESSION:   1. Mild right pleural effusion.   2. Consolidation in both lower lobes may represent atelectasis or   pneumonia..   3. 18 mm nodule upper pole right kidney 25 Hounsfield units. Recommend   surgery.  4. Air in the bladder may reflect recent instrumentation or infection.        Addendum:      Small bilateral pleural effusions, larger on the right with mild   overlying atelectasis.    There are multiple renal cysts with largest cyst measuring up to   approximately 4 cm in each kidney. I contacted ad radiologist Marcy Harris. The 18 mm nodule upper pole right kidney that she is referring to   is the most superior lesion in the right kidney. This has the appearance   of a cyst and surgery is not recommended.    In addition, trace pericardial effusion is seen.    There is mild fatty infiltration of the liver.    Nonspecific trace fluid seen right upper abdomen and trace fluid right   lower quadrant at the unremarkable appearing appendix.    Small amount of air is noted in the bilateral anterior abdominal wall   soft tissues presumably iatrogenic, i.e. related to injection.    Degenerative changes L4-5. Small nonspecific lucency seen in the L2   vertebral body.    Other findings as described in the vrad report.      < end of copied text >          Imaging Personally Reviewed:  [ ] YES  [ ] NO    Consultant(s) Notes Reviewed:  [x ] YES  [ ] NO    Care Discussed with Consultants/Other Providers [x ] YES  [ ] NO

## 2018-12-27 NOTE — PROGRESS NOTE ADULT - PROBLEM SELECTOR PLAN 4
cont therapeutic Lovenox for now.   Xarelto and eliquis too expensive and not covered by his medicaid.    started coumadin 10mg on 12/24/18 INR 1.7 on today . hopeful can d/c on tomorrow or saturday     continue with lovenox as well.

## 2018-12-27 NOTE — PROGRESS NOTE ADULT - PROBLEM SELECTOR PLAN 1
s/p intubation and successful extubation 12/21.   CTA was benign.   Presumed to severe asthma exacerbation. Resolved now.
s/p intubation and successful extubation 12/21.   CTA was benign.   Presumed to severe asthma exacerbation. Resolved now. lungs cta bl
s/p intubation and successful extubation 12/21.   CTA was benign.   Presumed to severe asthma exacerbation. Resolved now. lungs ctabl
s/p intubation and successful extubation 12/21.   CTA was benign.   Presumed to severe asthma exacerbation. Resolved now.
s/p intubation and successful extubation 12/21.   CTA was benign.   Presumed to severe asthma exacerbation. Resolved now. lungs cta bl

## 2018-12-27 NOTE — PROGRESS NOTE ADULT - PROBLEM SELECTOR PROBLEM 3
Bacteremia
Sepsis, due to unspecified organism
Sepsis, due to unspecified organism

## 2018-12-27 NOTE — CHART NOTE - NSCHARTNOTEFT_GEN_A_CORE
Assessment: Pt seen for follow-up. Pt with s/p acute resp faiure with s/p extubation 12/21. Pt continues IV abx due to sepsis. Noted CRISPIN resolved with IVF.     Factors impacting intake: [x ] none [ ] nausea  [ ] vomiting [ ] diarrhea [ ] constipation  [ ]chewing problems [ ] swallowing issues  [ ] other:     Diet Prescription: Diet, Consistent Carbohydrate w/Evening Snack:   Low Sodium (12-21-18 @ 09:58)    Intake: Po intake 100% consumed @ most meals. Last BM x 1 (12/23). Provided further diabetic diet instruction on portion control.    Current Weight: Weight (kg): Wt=84kg(12/27), Wt=83.9kg (12-20 @ 14:17)  % Weight Change 0.1kg wt gain x 1 week    Physical appearance: Lt arm nonpitting edema    Pertinent Medications: MEDICATIONS  (STANDING):  ALBUTerol    90 MICROgram(s) HFA Inhaler 1 Puff(s) Inhalation every 4 hours  ampicillin/sulbactam  IVPB 3 Gram(s) IV Intermittent every 6 hours  aspirin enteric coated 81 milliGRAM(s) Oral daily  atorvastatin 20 milliGRAM(s) Oral at bedtime  buDESOnide 160 MICROgram(s)/formoterol 4.5 MICROgram(s) Inhaler 2 Puff(s) Inhalation two times a day  carvedilol 12.5 milliGRAM(s) Oral every 12 hours  chlorhexidine 4% Liquid 1 Application(s) Topical <User Schedule>  dextrose 5%. 1000 milliLiter(s) (50 mL/Hr) IV Continuous <Continuous>  dextrose 50% Injectable 12.5 Gram(s) IV Push once  dextrose 50% Injectable 25 Gram(s) IV Push once  dextrose 50% Injectable 25 Gram(s) IV Push once  enoxaparin Injectable 80 milliGRAM(s) SubCutaneous every 12 hours  folic acid 1 milliGRAM(s) Oral daily  furosemide    Tablet 20 milliGRAM(s) Oral daily  influenza   Vaccine 0.5 milliLiter(s) IntraMuscular once  insulin glargine Injectable (LANTUS) 15 Unit(s) SubCutaneous at bedtime  insulin lispro (HumaLOG) corrective regimen sliding scale   SubCutaneous Before meals and at bedtime  insulin lispro Injectable (HumaLOG) 4 Unit(s) SubCutaneous before breakfast  insulin lispro Injectable (HumaLOG) 4 Unit(s) SubCutaneous before lunch  insulin lispro Injectable (HumaLOG) 4 Unit(s) SubCutaneous before dinner  lisinopril 20 milliGRAM(s) Oral daily  predniSONE   Tablet 20 milliGRAM(s) Oral daily  thiamine 100 milliGRAM(s) Oral daily  tiotropium 18 MICROgram(s) Capsule 1 Capsule(s) Inhalation daily  warfarin 7.5 milliGRAM(s) Oral once    MEDICATIONS  (PRN):  dextrose 40% Gel 15 Gram(s) Oral once PRN Blood Glucose LESS THAN 70 milliGRAM(s)/deciliter  glucagon  Injectable 1 milliGRAM(s) IntraMuscular once PRN Glucose LESS THAN 70 milligrams/deciliter    Pertinent Labs: 12-25 Na 141   Glu 174   K+ 3.6   Cr 0.86   BUN 17   Phos n/a   Alb 3.4(12/20)   PAB n/a   Hgb 11.0 g/dL<L> Hct 36.0 %<L> HgA1C n/a     24Hr FS:143 mg/dL  308 mg/dL  178 mg/dL  275 mg/dL    Skin: intact    Estimated Needs:   [x ] no change since previous assessment (12/21/18)  [ ] recalculated:     Previous Nutrition Diagnosis:   [ ] Inadequate Energy Intake [ ]Inadequate Oral Intake [ ] Excessive Energy Intake [x] Excessive CHO intake  [ ] Underweight [ ] Increased Nutrient Needs [ ] Overweight/Obesity   [ ] Altered GI Function [ ] Unintended Weight Loss [ ] Food & Nutrition Related Knowledge Deficit [ ] severe Malnutrition [ ] moderate malnutrition    Nutrition Diagnosis is [ ] ongoing  [ ] resolved  [x ] improved  [ ] not applicable   Previous Goal: Improve glycemic control; met  Pt will understand desirable A1C<7%; not met  Teach back 1 serving of 3 food high in CHO; not met    New Nutrition Diagnosis: [x ] not applicable       Interventions:   Recommend  [ x] Continue:  CC[facility] with snack/Low Na  [ ] Change Diet To:  [ ] Nutrition Supplement:  [ ] Nutrition Support:  [ ] Other:     Monitoring and Evaluation:   [x ] PO intake [ x ] Tolerance to diet prescription [ x ] weights [ x ] labs[ x ] follow up per protocol  [ ] other:

## 2018-12-28 ENCOUNTER — TRANSCRIPTION ENCOUNTER (OUTPATIENT)
Age: 63
End: 2018-12-28

## 2018-12-28 VITALS — HEART RATE: 72 BPM | DIASTOLIC BLOOD PRESSURE: 76 MMHG | SYSTOLIC BLOOD PRESSURE: 109 MMHG | TEMPERATURE: 100 F

## 2018-12-28 LAB
GLUCOSE BLDC GLUCOMTR-MCNC: 244 MG/DL — HIGH (ref 70–99)
GLUCOSE BLDC GLUCOMTR-MCNC: 343 MG/DL — HIGH (ref 70–99)
INR BLD: 2.21 RATIO — HIGH (ref 0.88–1.16)
PROTHROM AB SERPL-ACNC: 25.4 SEC — HIGH (ref 10–12.9)

## 2018-12-28 PROCEDURE — 99239 HOSP IP/OBS DSCHRG MGMT >30: CPT

## 2018-12-28 RX ORDER — BUDESONIDE AND FORMOTEROL FUMARATE DIHYDRATE 160; 4.5 UG/1; UG/1
1 AEROSOL RESPIRATORY (INHALATION)
Qty: 1 | Refills: 0 | OUTPATIENT
Start: 2018-12-28 | End: 2019-01-26

## 2018-12-28 RX ORDER — INSULIN NPH HUM/REG INSULIN HM 70-30/ML
15 VIAL (ML) SUBCUTANEOUS
Qty: 1 | Refills: 0 | OUTPATIENT
Start: 2018-12-28

## 2018-12-28 RX ORDER — INSULIN NPH HUM/REG INSULIN HM 70-30/ML
10 VIAL (ML) SUBCUTANEOUS
Qty: 1 | Refills: 0 | OUTPATIENT
Start: 2018-12-28

## 2018-12-28 RX ORDER — TIOTROPIUM BROMIDE 18 UG/1
1 CAPSULE ORAL; RESPIRATORY (INHALATION)
Qty: 1 | Refills: 0 | OUTPATIENT
Start: 2018-12-28 | End: 2019-01-26

## 2018-12-28 RX ORDER — FUROSEMIDE 40 MG
1 TABLET ORAL
Qty: 30 | Refills: 0 | OUTPATIENT
Start: 2018-12-28 | End: 2019-01-26

## 2018-12-28 RX ORDER — WARFARIN SODIUM 2.5 MG/1
1 TABLET ORAL
Qty: 30 | Refills: 0 | OUTPATIENT
Start: 2018-12-28 | End: 2019-01-26

## 2018-12-28 RX ORDER — LISINOPRIL 2.5 MG/1
1 TABLET ORAL
Qty: 30 | Refills: 0 | OUTPATIENT
Start: 2018-12-28 | End: 2019-01-26

## 2018-12-28 RX ORDER — ALBUTEROL 90 UG/1
1 AEROSOL, METERED ORAL
Qty: 1 | Refills: 0 | OUTPATIENT
Start: 2018-12-28 | End: 2019-01-26

## 2018-12-28 RX ORDER — THIAMINE MONONITRATE (VIT B1) 100 MG
1 TABLET ORAL
Qty: 30 | Refills: 0 | OUTPATIENT
Start: 2018-12-28 | End: 2019-01-26

## 2018-12-28 RX ORDER — FOLIC ACID 0.8 MG
1 TABLET ORAL
Qty: 30 | Refills: 0 | OUTPATIENT
Start: 2018-12-28 | End: 2019-01-26

## 2018-12-28 RX ORDER — ASPIRIN/CALCIUM CARB/MAGNESIUM 324 MG
1 TABLET ORAL
Qty: 30 | Refills: 0 | OUTPATIENT
Start: 2018-12-28 | End: 2019-01-26

## 2018-12-28 RX ORDER — ATORVASTATIN CALCIUM 80 MG/1
1 TABLET, FILM COATED ORAL
Qty: 30 | Refills: 0 | OUTPATIENT
Start: 2018-12-28 | End: 2019-01-26

## 2018-12-28 RX ORDER — CARVEDILOL PHOSPHATE 80 MG/1
1 CAPSULE, EXTENDED RELEASE ORAL
Qty: 60 | Refills: 0 | OUTPATIENT
Start: 2018-12-28 | End: 2019-01-26

## 2018-12-28 RX ADMIN — Medication 4 UNIT(S): at 08:36

## 2018-12-28 RX ADMIN — ALBUTEROL 1 PUFF(S): 90 AEROSOL, METERED ORAL at 12:09

## 2018-12-28 RX ADMIN — AMPICILLIN SODIUM AND SULBACTAM SODIUM 200 GRAM(S): 250; 125 INJECTION, POWDER, FOR SUSPENSION INTRAMUSCULAR; INTRAVENOUS at 00:42

## 2018-12-28 RX ADMIN — BUDESONIDE AND FORMOTEROL FUMARATE DIHYDRATE 2 PUFF(S): 160; 4.5 AEROSOL RESPIRATORY (INHALATION) at 05:45

## 2018-12-28 RX ADMIN — Medication 81 MILLIGRAM(S): at 11:39

## 2018-12-28 RX ADMIN — Medication 20 MILLIGRAM(S): at 05:47

## 2018-12-28 RX ADMIN — LISINOPRIL 20 MILLIGRAM(S): 2.5 TABLET ORAL at 05:45

## 2018-12-28 RX ADMIN — Medication 100 MILLIGRAM(S): at 14:09

## 2018-12-28 RX ADMIN — TIOTROPIUM BROMIDE 1 CAPSULE(S): 18 CAPSULE ORAL; RESPIRATORY (INHALATION) at 11:42

## 2018-12-28 RX ADMIN — Medication 4: at 08:35

## 2018-12-28 RX ADMIN — ALBUTEROL 1 PUFF(S): 90 AEROSOL, METERED ORAL at 05:44

## 2018-12-28 RX ADMIN — Medication 20 MILLIGRAM(S): at 05:48

## 2018-12-28 RX ADMIN — ALBUTEROL 1 PUFF(S): 90 AEROSOL, METERED ORAL at 02:45

## 2018-12-28 RX ADMIN — ENOXAPARIN SODIUM 80 MILLIGRAM(S): 100 INJECTION SUBCUTANEOUS at 05:45

## 2018-12-28 RX ADMIN — Medication 8: at 11:39

## 2018-12-28 RX ADMIN — Medication 1 MILLIGRAM(S): at 11:39

## 2018-12-28 RX ADMIN — AMPICILLIN SODIUM AND SULBACTAM SODIUM 200 GRAM(S): 250; 125 INJECTION, POWDER, FOR SUSPENSION INTRAMUSCULAR; INTRAVENOUS at 05:46

## 2018-12-28 RX ADMIN — Medication 4 UNIT(S): at 11:40

## 2018-12-28 NOTE — PROGRESS NOTE ADULT - PROVIDER SPECIALTY LIST ADULT
Cardiology
Critical Care
Critical Care
Hospitalist
Infectious Disease
Pulmonology
Hospitalist

## 2018-12-28 NOTE — PROGRESS NOTE ADULT - SUBJECTIVE AND OBJECTIVE BOX
HPI:  63 years old male by ems s/p et intubated c/o respiratory arrest. EMS sts pt was in a restaurant c/o sob and ems noted pt has a neb pump next to him pt was sob, unable to talk. Pt here is et intubated and breath sounds + expiratory wheezing bilaterally. Pt's wife is here at bed side now states pt had asthma attack one week ago was discharge from hospital with steroids  Recent history of PPM/AICD implantation at [facility] as per family. Patient seen and evaluated at bedside with ICU attending. CT head negative for acute infarct or bleed. CTA done  No CT evidence of pulmonary embolism. full report in chart. (20 Dec 2018 18:25)      Allergies    No Known Allergies    Intolerances        MEDICATIONS  (STANDING):  ALBUTerol    90 MICROgram(s) HFA Inhaler 1 Puff(s) Inhalation every 4 hours  ampicillin/sulbactam  IVPB 3 Gram(s) IV Intermittent every 6 hours  aspirin enteric coated 81 milliGRAM(s) Oral daily  atorvastatin 20 milliGRAM(s) Oral at bedtime  buDESOnide 160 MICROgram(s)/formoterol 4.5 MICROgram(s) Inhaler 2 Puff(s) Inhalation two times a day  carvedilol 12.5 milliGRAM(s) Oral every 12 hours  chlorhexidine 4% Liquid 1 Application(s) Topical <User Schedule>  dextrose 5%. 1000 milliLiter(s) (50 mL/Hr) IV Continuous <Continuous>  dextrose 50% Injectable 12.5 Gram(s) IV Push once  dextrose 50% Injectable 25 Gram(s) IV Push once  dextrose 50% Injectable 25 Gram(s) IV Push once  enoxaparin Injectable 80 milliGRAM(s) SubCutaneous every 12 hours  folic acid 1 milliGRAM(s) Oral daily  furosemide    Tablet 20 milliGRAM(s) Oral daily  influenza   Vaccine 0.5 milliLiter(s) IntraMuscular once  insulin glargine Injectable (LANTUS) 15 Unit(s) SubCutaneous at bedtime  insulin lispro (HumaLOG) corrective regimen sliding scale   SubCutaneous Before meals and at bedtime  insulin lispro Injectable (HumaLOG) 4 Unit(s) SubCutaneous before breakfast  insulin lispro Injectable (HumaLOG) 4 Unit(s) SubCutaneous before lunch  insulin lispro Injectable (HumaLOG) 4 Unit(s) SubCutaneous before dinner  lisinopril 20 milliGRAM(s) Oral daily  thiamine 100 milliGRAM(s) Oral daily  tiotropium 18 MICROgram(s) Capsule 1 Capsule(s) Inhalation daily    MEDICATIONS  (PRN):  dextrose 40% Gel 15 Gram(s) Oral once PRN Blood Glucose LESS THAN 70 milliGRAM(s)/deciliter  glucagon  Injectable 1 milliGRAM(s) IntraMuscular once PRN Glucose LESS THAN 70 milligrams/deciliter      REVIEW OF SYSTEMS:    CONSTITUTIONAL: No fever, chills, weight loss, or fatigue  HEENT: No sore throat, runny nose, ear ache  RESPIRATORY: No cough, wheezing, No shortness of breath  CARDIOVASCULAR: No chest pain, palpitations, dizziness  GASTROINTESTINAL: No abdominal pain. No nausea, vomiting, diarrhea  GENITOURINARY: No dysuria, increase frequency, hematuria, or incontinence  NEUROLOGICAL: No headaches, memory loss, loss of strength, numbness, or tremors, no weakness  EXTREMITY: No pedal edema BLE  SKIN: No itching, burning, rashes, or lesions     VITAL SIGNS:  T(C): 36.4 (12-28-18 @ 05:45), Max: 36.7 (12-27-18 @ 11:54)  T(F): 97.6 (12-28-18 @ 05:45), Max: 98.1 (12-27-18 @ 11:54)  HR: 59 (12-28-18 @ 05:45) (59 - 86)  BP: 140/84 (12-28-18 @ 05:45) (109/62 - 140/84)  RR: 17 (12-28-18 @ 05:45) (17 - 18)  SpO2: 100% (12-28-18 @ 05:45) (98% - 100%)  Wt(kg): --    PHYSICAL EXAM:    GENERAL: not in any distress  HEENT: Neck is supple, normocephalic, atraumatic   CHEST/LUNG: Clear to percussion bilaterally; No rales, rhonchi, wheezing  HEART: Regular rate and rhythm; No murmurs, rubs, or gallops  ABDOMEN: Soft, Nontender, Nondistended; Bowel sounds present, no rebound   EXTREMITIES:  2+ Peripheral Pulses, No clubbing, cyanosis, or edema  GENITOURINARY:   SKIN: No rashes or lesions  BACK: no pressor sore   NERVOUS SYSTEM:  Alert & Oriented X3, Good concentration  PSYCH: normal affect     LABS:             PT/INR - ( 28 Dec 2018 08:13 )   PT: 25.4 sec;   INR: 2.21 ratio                                 Culture Results:   No growth at 5 days. (12-21 @ 15:26)                Radiology:

## 2018-12-28 NOTE — DISCHARGE NOTE ADULT - CARE PROVIDER_API CALL
Niesha Ahuja), Medicine  [address] [facility]  [address]  [place] [address]  Phone: [phone]  Fax: [phone]    Gutierrez Stovall), Internal Medicine; Nuclear Cardiology  [address] [place] [address]  Phone: [phone]  Fax: [phone]

## 2018-12-28 NOTE — DISCHARGE NOTE ADULT - HOSPITAL COURSE
Assessment	  62 y/o  male PMH Asthma? DM. CAD sp PPM/AICD, CVA,  admitted with respiratory distress requiring intubation in the field by EMS, unclear etiology, possible 2/2 asthma exacerbation.  CTA negative for PE or acute airspace disease. CT head with old infarcts.         Problem/Plan - 1:  ·  Problem: Acute respiratory failure with hypoxia.  Plan: s/p intubation and successful extubation 12/21.   CTA was benign.   Presumed to severe asthma exacerbation. Resolved now. lungs cta bl.      Problem/Plan - 2:  ·  Problem: Asthma with acute exacerbation, unspecified asthma severity, unspecified whether persistent.  Plan: acute exacerbation resolved.   cont w/ tapering steroids, Spiriva Duonebs. prn.   was cleared by pulmonary for d/c on 12/24/18.      Problem/Plan - 3:  ·  Problem: Sepsis, due to unspecified organism.  Plan: SEPSIS POA  cause Staph capitis, enterococcus, acinetobacer, source unclear, most likely a contamination.   Infectious disease note appreciated and recommend continuation of antibx till 1/4/18 can change to oral at d/c   CT abd noted no s/o infection    incidental renal cysts noted .      Problem/Plan - 4:  ·  Problem: Acute deep vein thrombosis (DVT) of other vein of upper extremity.  Plan: cont therapeutic Lovenox for now.   Xarelto and eliquis too expensive and not covered by his medicaid.    started coumadin 10mg on 12/24/18 INR 2.2 will d/c on      continue with lovenox as well.      Problem/Plan - 5:  ·  Problem: Essential hypertension.  Plan: cont w/ same mgmt.      Problem/Plan - 6:  Problem: Type 2 diabetes mellitus with complication, with long-term current use of insulin. Plan: cont w/ Lantus and prandial Humalog  FS monitoring with insulin s/s coverage.   hgba1c at 10.1.  changed to  humulin 70/30  15units ac bk 10 units qac dinner     Problem/Plan - 7:  ·  Problem: Cerebrovascular accident (CVA), unspecified mechanism.  Plan: h/o CVA cont aspirin and statin.      Problem/Plan - 8:  ·  Problem: Chronic diastolic congestive heart failure.  Plan: stable continue medical management.      Problem/Plan - 9:  ·  Problem: CRISPIN (acute kidney injury).  Plan: Resolved with IVF  Hypophosphatemia: resolved.       TIME SPENT COMPLETING DISCHARGE AND COORDINATING CARE WITH NURSING,  AND CASE MANAGEMENT APPROX 40MINUTES Assessment	  64 y/o  male PMH Asthma? DM. CAD sp PPM/AICD, CVA,  admitted with respiratory distress requiring intubation in the field by EMS, unclear etiology, possible 2/2 asthma exacerbation.  CTA negative for PE or acute airspace disease. CT head with old infarcts.         Problem/Plan - 1:  ·  Problem: Acute respiratory failure with hypoxia.  Plan: s/p intubation and successful extubation 12/21.   CTA was benign.   Presumed to severe asthma exacerbation. Resolved now. lungs cta bl.      Problem/Plan - 2:  ·  Problem: Asthma with acute exacerbation, unspecified asthma severity, unspecified whether persistent.  Plan: acute exacerbation resolved.   s/p tapering steroids, Spiriva Duonebs. prn.   was cleared by pulmonary for d/c on 12/24/18.   f/u chasidy or at [facility]      Problem/Plan - 3:  ·  Problem: Sepsis, due to unspecified organism.  Plan: SEPSIS POA  cause Staph capitis, enterococcus, acinetobacer, source unclear, most likely a contamination.   Infectious disease note appreciated and recommend continuation of antibx till 1/4/18 can change to oral at d/c   CT abd noted no s/o infection    incidental renal cysts noted .      Problem/Plan - 4:  ·  Problem: Acute deep vein thrombosis (DVT) of other vein of upper extremity.  Plan: cont therapeutic Lovenox for now.   Xarelto and eliquis too expensive and not covered by his medicaid.    started coumadin 10mg on 12/24/18 INR 2.2 will d/c on      continue with lovenox as well.      Problem/Plan - 5:  ·  Problem: Essential hypertension.  Plan: cont w/ same mgmt.      Problem/Plan - 6:  Problem: Type 2 diabetes mellitus with complication, with long-term current use of insulin. Plan: cont w/ Lantus and prandial Humalog  FS monitoring with insulin s/s coverage.   hgba1c at 10.1.  changed to  humulin 70/30  15units ac bk 10 units qac dinner     Problem/Plan - 7:  ·  Problem: Cerebrovascular accident (CVA), unspecified mechanism.  Plan: h/o CVA cont aspirin and statin.      Problem/Plan - 8:  ·  Problem: Chronic diastolic congestive heart failure.  Plan: stable continue medical management.    f/u with cardiologist at [facility] or dr. jett     Problem/Plan - 9:  ·  Problem: CRISPIN (acute kidney injury).  Plan: Resolved with IVF  Hypophosphatemia: resolved.       TIME SPENT COMPLETING DISCHARGE AND COORDINATING CARE WITH NURSING,  AND CASE MANAGEMENT APPROX 40MINUTES

## 2018-12-28 NOTE — PROGRESS NOTE ADULT - ASSESSMENT
S/P Hypoxic Respiratory failure.  Acute COPD exacerbation.  Atelectasis/Pneumonia.    benzos in system ?? from ??   blood culture ; single set anerobic cns;; aerobic e faecium and acinetobacter all sensitive to ampicillin   likely contaminants , unclear source   CT abd noted no impressive infection , clinically ok  incidental renal cysts noted .     cardiac follow up ?? ac resp failure from cardiac issues   will continue Augmentin 500 q 8 hr for 2 weeks from 12/21, when first culture negative. END DATE 1/4/2019.   ID WILL SIGN OFF  PLEASE CALL BACK FOR ANY CONCERN   THANKS

## 2018-12-28 NOTE — DISCHARGE NOTE ADULT - CARE PLAN
Principal Discharge DX:	Acute respiratory failure with hypoxia  Goal:	resolved  Assessment and plan of treatment:	resolved , f/u pulmonary and pcp  Secondary Diagnosis:	Chronic diastolic congestive heart failure  Goal:	stable  Secondary Diagnosis:	Cerebrovascular accident (CVA), unspecified mechanism  Goal:	continue  Secondary Diagnosis:	Sepsis, due to unspecified organism  Goal:	complete antibx  Secondary Diagnosis:	Essential hypertension  Goal:	continue with meds  Secondary Diagnosis:	Type 2 diabetes mellitus without complication, with long-term current use of insulin  Goal:	changed to insulin  Secondary Diagnosis:	Acute deep vein thrombosis (DVT) of other vein of upper extremity  Goal:	continue with coumadin

## 2018-12-28 NOTE — PROGRESS NOTE ADULT - REASON FOR ADMISSION
respiratory failure

## 2018-12-28 NOTE — DISCHARGE NOTE ADULT - PLAN OF CARE
resolved resolved , f/u pulmonary and pcp stable continue complete antibx continue with meds changed to insulin continue with coumadin

## 2018-12-28 NOTE — DISCHARGE NOTE ADULT - SECONDARY DIAGNOSIS.
Chronic diastolic congestive heart failure Cerebrovascular accident (CVA), unspecified mechanism Sepsis, due to unspecified organism Essential hypertension Type 2 diabetes mellitus without complication, with long-term current use of insulin Acute deep vein thrombosis (DVT) of other vein of upper extremity

## 2018-12-28 NOTE — DISCHARGE NOTE ADULT - MEDICATION SUMMARY - MEDICATIONS TO TAKE
I will START or STAY ON the medications listed below when I get home from the hospital:    aspirin 81 mg oral delayed release tablet  -- 1 tab(s) by mouth once a day  -- Indication: For H/o cva    lisinopril 20 mg oral tablet  -- 1 tab(s) by mouth once a day  -- Indication: For HTN (hypertension)    Coumadin 5 mg oral tablet  -- 1 tab(s) by mouth once a day   -- Do not take this drug if you are pregnant.  It is very important that you take or use this exactly as directed.  Do not skip doses or discontinue unless directed by your doctor.  Obtain medical advice before taking any non-prescription drugs as some may affect the action of this medication.    -- Indication: For Dvt    HumuLIN 70/30 KwikPen 70 units-30 units/mL subcutaneous suspension  -- 10 unit(s) subcutaneous once a day  before dinner   -- Do not drink alcoholic beverages when taking this medication.  It is very important that you take or use this exactly as directed.  Do not skip doses or discontinue unless directed by your doctor.  Keep in refrigerator.  Do not freeze.    -- Indication: For Diabetes    HumuLIN 70/30 KwikPen 70 units-30 units/mL subcutaneous suspension  -- 15 unit(s) subcutaneous once a day before breakfast  -- Do not drink alcoholic beverages when taking this medication.  It is very important that you take or use this exactly as directed.  Do not skip doses or discontinue unless directed by your doctor.  Keep in refrigerator.  Do not freeze.    -- Indication: For Diabetes    atorvastatin 20 mg oral tablet  -- 1 tab(s) by mouth once a day (at bedtime)   -- Indication: For Hyperlipidemia    carvedilol 12.5 mg oral tablet  -- 1 tab(s) by mouth every 12 hours  -- Indication: For HTN (hypertension)    albuterol 90 mcg/inh inhalation aerosol  -- 1 puff(s) inhaled every 4 hours  -- Indication: For Asthma    budesonide-formoterol 160 mcg-4.5 mcg/inh inhalation aerosol  -- 1 puff(s) inhaled 2 times a day   -- Indication: For Asthma    tiotropium 18 mcg inhalation capsule  -- 1 cap(s) inhaled once a day  -- Indication: For Asthma    furosemide 20 mg oral tablet  -- 1 tab(s) by mouth once a day  -- Indication: For Chf    amoxicillin-clavulanate 500 mg-125 mg oral tablet  -- 1 tab(s) by mouth every 8 hours   -- Finish all this medication unless otherwise directed by prescriber.  Take with food or milk.    -- Indication: For Sepsis, due to unspecified organism    thiamine 100 mg oral tablet  -- 1 tab(s) by mouth once a day  -- Indication: For general    folic acid 1 mg oral tablet  -- 1 tab(s) by mouth once a day  -- Indication: For general

## 2018-12-28 NOTE — DISCHARGE NOTE ADULT - PATIENT PORTAL LINK FT
You can access the [facility] Patient Portal, offered by [facility], by registering with the following website: [email or web address]

## 2018-12-31 ENCOUNTER — INBOUND DOCUMENT (OUTPATIENT)
Age: 63
End: 2018-12-31

## 2019-01-02 DIAGNOSIS — I25.10 ATHEROSCLEROTIC HEART DISEASE OF NATIVE CORONARY ARTERY WITHOUT ANGINA PECTORIS: ICD-10-CM

## 2019-01-02 DIAGNOSIS — A41.9 SEPSIS, UNSPECIFIED ORGANISM: ICD-10-CM

## 2019-01-02 DIAGNOSIS — J18.9 PNEUMONIA, UNSPECIFIED ORGANISM: ICD-10-CM

## 2019-01-02 DIAGNOSIS — Z87.891 PERSONAL HISTORY OF NICOTINE DEPENDENCE: ICD-10-CM

## 2019-01-02 DIAGNOSIS — Z28.21 IMMUNIZATION NOT CARRIED OUT BECAUSE OF PATIENT REFUSAL: ICD-10-CM

## 2019-01-02 DIAGNOSIS — E11.9 TYPE 2 DIABETES MELLITUS WITHOUT COMPLICATIONS: ICD-10-CM

## 2019-01-02 DIAGNOSIS — E87.2 ACIDOSIS: ICD-10-CM

## 2019-01-02 DIAGNOSIS — D64.9 ANEMIA, UNSPECIFIED: ICD-10-CM

## 2019-01-02 DIAGNOSIS — I07.1 RHEUMATIC TRICUSPID INSUFFICIENCY: ICD-10-CM

## 2019-01-02 DIAGNOSIS — J45.51 SEVERE PERSISTENT ASTHMA WITH (ACUTE) EXACERBATION: ICD-10-CM

## 2019-01-02 DIAGNOSIS — Z86.73 PERSONAL HISTORY OF TRANSIENT ISCHEMIC ATTACK (TIA), AND CEREBRAL INFARCTION WITHOUT RESIDUAL DEFICITS: ICD-10-CM

## 2019-01-02 DIAGNOSIS — J44.1 CHRONIC OBSTRUCTIVE PULMONARY DISEASE WITH (ACUTE) EXACERBATION: ICD-10-CM

## 2019-01-02 DIAGNOSIS — I11.0 HYPERTENSIVE HEART DISEASE WITH HEART FAILURE: ICD-10-CM

## 2019-01-02 DIAGNOSIS — J96.01 ACUTE RESPIRATORY FAILURE WITH HYPOXIA: ICD-10-CM

## 2019-01-02 DIAGNOSIS — I50.32 CHRONIC DIASTOLIC (CONGESTIVE) HEART FAILURE: ICD-10-CM

## 2019-01-02 DIAGNOSIS — I37.1 NONRHEUMATIC PULMONARY VALVE INSUFFICIENCY: ICD-10-CM

## 2019-01-02 DIAGNOSIS — I35.1 NONRHEUMATIC AORTIC (VALVE) INSUFFICIENCY: ICD-10-CM

## 2019-01-02 DIAGNOSIS — Z95.0 PRESENCE OF CARDIAC PACEMAKER: ICD-10-CM

## 2019-01-02 DIAGNOSIS — J98.11 ATELECTASIS: ICD-10-CM

## 2019-01-02 DIAGNOSIS — R06.02 SHORTNESS OF BREATH: ICD-10-CM

## 2019-01-02 DIAGNOSIS — I82.622 ACUTE EMBOLISM AND THROMBOSIS OF DEEP VEINS OF LEFT UPPER EXTREMITY: ICD-10-CM

## 2019-01-02 DIAGNOSIS — E83.39 OTHER DISORDERS OF PHOSPHORUS METABOLISM: ICD-10-CM

## 2019-02-07 ENCOUNTER — EMERGENCY (EMERGENCY)
Facility: HOSPITAL | Age: 64
LOS: 0 days | Discharge: HOME | End: 2019-02-08
Attending: EMERGENCY MEDICINE
Payer: MEDICAID

## 2019-02-07 VITALS
RESPIRATION RATE: 17 BRPM | DIASTOLIC BLOOD PRESSURE: 94 MMHG | WEIGHT: 175.05 LBS | TEMPERATURE: 98 F | SYSTOLIC BLOOD PRESSURE: 151 MMHG | HEART RATE: 62 BPM | HEIGHT: 68 IN | OXYGEN SATURATION: 100 %

## 2019-02-07 DIAGNOSIS — Z95.0 PRESENCE OF CARDIAC PACEMAKER: Chronic | ICD-10-CM

## 2019-02-07 PROBLEM — J45.909 UNSPECIFIED ASTHMA, UNCOMPLICATED: Chronic | Status: ACTIVE | Noted: 2018-12-20

## 2019-02-07 PROBLEM — I63.9 CEREBRAL INFARCTION, UNSPECIFIED: Chronic | Status: ACTIVE | Noted: 2018-12-20

## 2019-02-07 PROBLEM — I25.10 ATHEROSCLEROTIC HEART DISEASE OF NATIVE CORONARY ARTERY WITHOUT ANGINA PECTORIS: Chronic | Status: ACTIVE | Noted: 2018-12-20

## 2019-02-07 PROBLEM — E11.9 TYPE 2 DIABETES MELLITUS WITHOUT COMPLICATIONS: Chronic | Status: ACTIVE | Noted: 2018-12-20

## 2019-02-07 PROBLEM — I10 ESSENTIAL (PRIMARY) HYPERTENSION: Chronic | Status: ACTIVE | Noted: 2018-12-20

## 2019-02-07 LAB
ALBUMIN SERPL ELPH-MCNC: 3.6 G/DL — SIGNIFICANT CHANGE UP (ref 3.3–5)
ALP SERPL-CCNC: 43 U/L — SIGNIFICANT CHANGE UP (ref 40–120)
ALT FLD-CCNC: 24 U/L — SIGNIFICANT CHANGE UP (ref 12–78)
ANION GAP SERPL CALC-SCNC: 8 MMOL/L — SIGNIFICANT CHANGE UP (ref 5–17)
APTT BLD: 38.8 SEC — HIGH (ref 27.5–36.3)
AST SERPL-CCNC: 18 U/L — SIGNIFICANT CHANGE UP (ref 15–37)
BASOPHILS # BLD AUTO: 0.03 K/UL — SIGNIFICANT CHANGE UP (ref 0–0.2)
BASOPHILS NFR BLD AUTO: 0.8 % — SIGNIFICANT CHANGE UP (ref 0–2)
BILIRUB SERPL-MCNC: 0.3 MG/DL — SIGNIFICANT CHANGE UP (ref 0.2–1.2)
BUN SERPL-MCNC: 17 MG/DL — SIGNIFICANT CHANGE UP (ref 7–23)
CALCIUM SERPL-MCNC: 8.5 MG/DL — SIGNIFICANT CHANGE UP (ref 8.5–10.1)
CHLORIDE SERPL-SCNC: 109 MMOL/L — HIGH (ref 96–108)
CK MB BLD-MCNC: 1.6 % — SIGNIFICANT CHANGE UP (ref 0–3.5)
CK MB CFR SERPL CALC: 5.6 NG/ML — HIGH (ref 0.5–3.6)
CK SERPL-CCNC: 345 U/L — HIGH (ref 26–308)
CO2 SERPL-SCNC: 26 MMOL/L — SIGNIFICANT CHANGE UP (ref 22–31)
CREAT SERPL-MCNC: 0.89 MG/DL — SIGNIFICANT CHANGE UP (ref 0.5–1.3)
EOSINOPHIL # BLD AUTO: 0.34 K/UL — SIGNIFICANT CHANGE UP (ref 0–0.5)
EOSINOPHIL NFR BLD AUTO: 9.5 % — HIGH (ref 0–6)
GLUCOSE SERPL-MCNC: 126 MG/DL — HIGH (ref 70–99)
HCT VFR BLD CALC: 36.4 % — LOW (ref 39–50)
HGB BLD-MCNC: 11.3 G/DL — LOW (ref 13–17)
IMM GRANULOCYTES NFR BLD AUTO: 0 % — SIGNIFICANT CHANGE UP (ref 0–1.5)
INR BLD: 2.19 RATIO — HIGH (ref 0.88–1.16)
LYMPHOCYTES # BLD AUTO: 1.18 K/UL — SIGNIFICANT CHANGE UP (ref 1–3.3)
LYMPHOCYTES # BLD AUTO: 33 % — SIGNIFICANT CHANGE UP (ref 13–44)
MCHC RBC-ENTMCNC: 27.8 PG — SIGNIFICANT CHANGE UP (ref 27–34)
MCHC RBC-ENTMCNC: 31 GM/DL — LOW (ref 32–36)
MCV RBC AUTO: 89.7 FL — SIGNIFICANT CHANGE UP (ref 80–100)
MONOCYTES # BLD AUTO: 0.4 K/UL — SIGNIFICANT CHANGE UP (ref 0–0.9)
MONOCYTES NFR BLD AUTO: 11.2 % — SIGNIFICANT CHANGE UP (ref 2–14)
NEUTROPHILS # BLD AUTO: 1.63 K/UL — LOW (ref 1.8–7.4)
NEUTROPHILS NFR BLD AUTO: 45.5 % — SIGNIFICANT CHANGE UP (ref 43–77)
PLATELET # BLD AUTO: 117 K/UL — LOW (ref 150–400)
POTASSIUM SERPL-MCNC: 4.1 MMOL/L — SIGNIFICANT CHANGE UP (ref 3.5–5.3)
POTASSIUM SERPL-SCNC: 4.1 MMOL/L — SIGNIFICANT CHANGE UP (ref 3.5–5.3)
PROT SERPL-MCNC: 7.1 GM/DL — SIGNIFICANT CHANGE UP (ref 6–8.3)
PROTHROM AB SERPL-ACNC: 25.1 SEC — HIGH (ref 10–12.9)
RBC # BLD: 4.06 M/UL — LOW (ref 4.2–5.8)
RBC # FLD: 14.7 % — HIGH (ref 10.3–14.5)
SODIUM SERPL-SCNC: 143 MMOL/L — SIGNIFICANT CHANGE UP (ref 135–145)
TROPONIN I SERPL-MCNC: 0.17 NG/ML — HIGH (ref 0.01–0.04)
WBC # BLD: 3.58 K/UL — LOW (ref 3.8–10.5)
WBC # FLD AUTO: 3.58 K/UL — LOW (ref 3.8–10.5)

## 2019-02-07 PROCEDURE — 71045 X-RAY EXAM CHEST 1 VIEW: CPT | Mod: 26

## 2019-02-07 PROCEDURE — 99285 EMERGENCY DEPT VISIT HI MDM: CPT

## 2019-02-07 RX ORDER — ASPIRIN/CALCIUM CARB/MAGNESIUM 324 MG
325 TABLET ORAL ONCE
Qty: 0 | Refills: 0 | Status: COMPLETED | OUTPATIENT
Start: 2019-02-07 | End: 2019-02-07

## 2019-02-07 RX ORDER — IPRATROPIUM/ALBUTEROL SULFATE 18-103MCG
3 AEROSOL WITH ADAPTER (GRAM) INHALATION
Qty: 0 | Refills: 0 | Status: COMPLETED | OUTPATIENT
Start: 2019-02-07 | End: 2019-02-07

## 2019-02-07 RX ADMIN — Medication 3 MILLILITER(S): at 18:30

## 2019-02-07 RX ADMIN — Medication 3 MILLILITER(S): at 20:23

## 2019-02-07 RX ADMIN — Medication 325 MILLIGRAM(S): at 21:53

## 2019-02-07 RX ADMIN — Medication 60 MILLIGRAM(S): at 19:10

## 2019-02-07 RX ADMIN — Medication 3 MILLILITER(S): at 18:55

## 2019-02-07 NOTE — ED PROVIDER NOTE - ATTENDING CONTRIBUTION TO CARE
Patient evaluated and seen with STEFANIA Alvarado agree with above history and physical - pt examined and seen by me personally - findings as seen: Pt with asthma exacerbation likely SOB starting 2 hours ago, however due to recent hx of respiratory arrest though lungs clear after 1 neb treatment - will observe and repeat enzymes to ensure improvement and no cardiac involvement. Pt signed out to Dr. Brice pending repeat enzymes

## 2019-02-07 NOTE — ED PROVIDER NOTE - PROGRESS NOTE DETAILS
chest xray neg for acute pathology, trop 0.167, pt was recently dc with trop 0.2, will get second set. pt is otherwise feeling better, no sob 02 100%RA Pt signed out by Dr. Marcum pending labs. Troponin elevated, less than prior troponin, and stabilized. CT negative for PE. SOB resolved, pt eager for discharge, breathing comfortably.

## 2019-02-07 NOTE — ED PROVIDER NOTE - PHYSICAL EXAMINATION
Gen: Alert, NAD, not toxic  Head: NC, AT, PERRL, EOMI, normal lids/conjunctiva  ENT: B TM WNL, normal hearing, patent oropharynx without erythema/exudate, uvula midline  Neck: +supple, no tenderness/meningismus/JVD, +Trachea midline  Pulm: Bilateral BS, normal resp effort, no wheeze/stridor/retractions  CV: RRR, no M/R/G, +dist pulses  Abd: soft, NT/ND, +BS, no hepatosplenomegaly  Mskel: no edema/erythema/cyanosis  Skin: no rash  Neuro: AAOx3, no sensory/motor deficits, CN 2-12 intact

## 2019-02-07 NOTE — ED ADULT NURSE NOTE - ED STAT RN HANDOFF DETAILS
Pt stable and resting in bed. Pt denies any pain or discomfort as of this time. Pt handoff report given to LYDIA Gray for continuum of care. No sign of distress noted.

## 2019-02-07 NOTE — ED ADULT NURSE NOTE - OBJECTIVE STATEMENT
Pt received in bed alert and oriented with the c/o difficulty breathing at 3pm from an asthma attack. Pt states that it just came on suddenly but he is feeling a lot better. Pt is stable and no sign of resp distress noted. Pt now awaiting eval from ER MD. Nursing care ongoing and safety maintained.

## 2019-02-07 NOTE — ED PROVIDER NOTE - MEDICAL DECISION MAKING DETAILS
pt here with asthma exacerbation, no relief with inhaler, recent intubation, will get labs, nebs, steroids, chest xray, monitor

## 2019-02-07 NOTE — ED PROVIDER NOTE - OBJECTIVE STATEMENT
62 y/o male with PMH HTN, Asthma DM, CAD sp PPM/AICD, CVA 2018 on coumadin here c/o sob x 3 hours ago. pt states he used his ventolin inhaler without relief. pt was recently admitted here and intubated for resp arrest on 12/20. pt states he was given nebs in ems and feels better, but still with some chest tightness. no chest pain. pt otherwise denies HA, n/v, abd pain    ROS: No fever/chills. No eye pain/changes in vision, No ear pain/sore throat/dysphagia, No chest pain/palpitations. ++ SOB/cough/. No abdominal pain, N/V/D, no black/bloody bm. No dysuria/frequency/discharge, No headache. No Dizziness.    No rashes or breaks in skin. No numbness/tingling/weakness.

## 2019-02-07 NOTE — ED ADULT NURSE NOTE - NSIMPLEMENTINTERV_GEN_ALL_ED
Implemented All Universal Safety Interventions:  [place] to call system. Call bell, personal items and telephone within reach. Instruct patient to call for assistance. Room bathroom lighting operational. Non-slip footwear when patient is off stretcher. Physically safe environment: no spills, clutter or unnecessary equipment. Stretcher in lowest position, wheels locked, appropriate side rails in place.

## 2019-02-08 VITALS
DIASTOLIC BLOOD PRESSURE: 80 MMHG | SYSTOLIC BLOOD PRESSURE: 148 MMHG | HEART RATE: 74 BPM | RESPIRATION RATE: 14 BRPM | OXYGEN SATURATION: 98 %

## 2019-02-08 LAB
D DIMER BLD IA.RAPID-MCNC: 245 NG/ML DDU — HIGH
NT-PROBNP SERPL-SCNC: 246 PG/ML — HIGH (ref 0–125)
TROPONIN I SERPL-MCNC: 0.18 NG/ML — HIGH (ref 0.01–0.04)
TROPONIN I SERPL-MCNC: 0.18 NG/ML — HIGH (ref 0.01–0.04)

## 2019-02-08 PROCEDURE — 71275 CT ANGIOGRAPHY CHEST: CPT | Mod: 26

## 2019-02-09 DIAGNOSIS — Z79.4 LONG TERM (CURRENT) USE OF INSULIN: ICD-10-CM

## 2019-02-09 DIAGNOSIS — I25.10 ATHEROSCLEROTIC HEART DISEASE OF NATIVE CORONARY ARTERY WITHOUT ANGINA PECTORIS: ICD-10-CM

## 2019-02-09 DIAGNOSIS — I10 ESSENTIAL (PRIMARY) HYPERTENSION: ICD-10-CM

## 2019-02-09 DIAGNOSIS — Z95.810 PRESENCE OF AUTOMATIC (IMPLANTABLE) CARDIAC DEFIBRILLATOR: ICD-10-CM

## 2019-02-09 DIAGNOSIS — J45.901 UNSPECIFIED ASTHMA WITH (ACUTE) EXACERBATION: ICD-10-CM

## 2019-02-09 DIAGNOSIS — Z86.73 PERSONAL HISTORY OF TRANSIENT ISCHEMIC ATTACK (TIA), AND CEREBRAL INFARCTION WITHOUT RESIDUAL DEFICITS: ICD-10-CM

## 2019-02-09 DIAGNOSIS — Z79.82 LONG TERM (CURRENT) USE OF ASPIRIN: ICD-10-CM

## 2019-02-09 DIAGNOSIS — J45.41 MODERATE PERSISTENT ASTHMA WITH (ACUTE) EXACERBATION: ICD-10-CM

## 2019-02-09 DIAGNOSIS — Z95.0 PRESENCE OF CARDIAC PACEMAKER: ICD-10-CM

## 2019-10-28 NOTE — CONSULT NOTE ADULT - PROVIDER SPECIALTY LIST ADULT
Chief Complaint   Patient presents with   • Shortness of Breath     for about a couple months,more when walking and doing stuff, pt tried Boost (oxygen in a can), pts states Card told him to cut one of the BP meds in half and if it started running low, take the whole tab again    • Dizziness     on and off, spoke to Benton Lawrence little shaky        Mr. Oneill is a 80-year-old man with past medical history of HTN, exertional dyspnea, LBBB, T2DM who presents with shortness of breath. He reports that this summer, he was unable to mow his lawn or take care of other yardwork due to the shortness of breath. He has tried Boost oxygen with minimal relief and is very perturbed by his symptoms.    Saw Dr. Soni on 8/5/2019 for similar problem, TTE and Lexiscan ordered, Lexiscan not performed.  TTE was essentially normal.      Review of Systems   Constitutional: Positive for fatigue. Negative for fever and unexpected weight change.   HENT: Negative for sinus pressure, sinus pain and sore throat.    Respiratory: Positive for shortness of breath. Negative for cough, chest tightness and wheezing.    Cardiovascular: Negative for chest pain, palpitations and leg swelling.   Gastrointestinal: Negative for blood in stool, constipation, diarrhea, nausea and vomiting.   Genitourinary: Negative for difficulty urinating, dysuria and hematuria.   Musculoskeletal: Positive for gait problem. Negative for arthralgias, back pain and joint swelling.   Skin: Negative for rash and wound.   Neurological: Negative for dizziness, syncope, weakness and light-headedness.       ALLERGIES:  No Known Allergies     Current Outpatient Medications   Medication Sig Dispense Refill   • losartan (COZAAR) 100 MG tablet TAKE 1 TABLET DAILY 90 tablet 0   • atorvastatin (LIPITOR) 20 MG tablet TAKE ONE-HALF (1/2) TABLET DAILY (NEED APPOINTMENT BEFORE NEXT REFILL) 45 tablet 3   • amLODIPine (NORVASC) 10 MG tablet Take 0.5 tablets by mouth daily. 90 tablet 0  Pulmonology   • metFORMIN (GLUCOPHAGE) 500 MG tablet TAKE 1 TABLET TWICE DAILY WITH MEALS. 180 tablet 3     No current facility-administered medications for this visit.        Immunization History   Administered Date(s) Administered   • Influenza, high dose seasonal, preservative-free 09/21/2014, 09/16/2015, 09/10/2016, 09/25/2017, 10/12/2019   • Influenza, seasonal, injectable, trivalent 11/15/2010, 09/16/2016   • Pneumococcal Conjugate 13 valent 01/15/2016   • Pneumococcal polysaccharide, adult, 23 valent 10/30/2009   • influenza, trivalent, adjuvanted 11/05/2018       Patient Active Problem List   Diagnosis   • Benign essential hypertension   • Complete left bundle branch block   • BPH without obstruction/lower urinary tract symptoms   • Hyperglycemia   • Other hyperlipidemia   • Sciatica of left side   • Type 2 diabetes mellitus without complication, without long-term current use of insulin (CMS/[facility])   • Incomplete emptying of bladder   • Unsteady gait   • LBBB (left bundle branch block)   • Dyspnea on exertion   • Hyperlipidemia       Visit Vitals  BP 98/63   Pulse 92   SpO2 (!) 89%       Physical Exam  Constitutional:       Appearance: Normal appearance.   HENT:      Head: Normocephalic and atraumatic.      Right Ear: Tympanic membrane, ear canal and external ear normal. There is no impacted cerumen.      Left Ear: Tympanic membrane, ear canal and external ear normal. There is no impacted cerumen.      Nose: Nose normal. No congestion or rhinorrhea.      Mouth/Throat:      Mouth: Mucous membranes are moist.   Eyes:      Extraocular Movements: Extraocular movements intact.   Neck:      Musculoskeletal: Normal range of motion and neck supple.      Vascular: No carotid bruit.      Comments: No JVD  Cardiovascular:      Rate and Rhythm: Normal rate and regular rhythm.      Pulses: Normal pulses.      Heart sounds: Normal heart sounds. No murmur. No friction rub. No gallop.    Pulmonary:      Effort: Pulmonary effort is  normal. No respiratory distress.      Breath sounds: Normal breath sounds. No wheezing.      Comments: Walk test: O2 sat 86% after 1 minute; patient unable to complete exam 2/2 to shortness of breath.  Abdominal:      General: Bowel sounds are normal.      Palpations: Abdomen is soft.      Comments: Obese   Musculoskeletal: Normal range of motion.         General: No swelling or deformity.   Skin:     General: Skin is warm and dry.   Neurological:      General: No focal deficit present.      Mental Status: He is alert and oriented to person, place, and time.      Comments: Abnormal gait with left leg limp         Assessment/Plan:  Dyspnea on exertion  Walk test with desaturation to 86% after 1 minute; pt unable to continue test 2/2 to shortness of breath  - Ordered 2V CXR  -Reinforced need for Lexiscan; pt provided with [facility] phone number      Benign essential hypertension  Hypertension is improving with treatment.  Continue current treatment regimen.  Dietary sodium restriction.  Blood pressure will be reassessed at the next regular appointment.      D/w Dr. Thierry Rivas, DO  PGYI

## 2020-04-23 NOTE — PATIENT PROFILE ADULT - PATIENT REPRESENTATIVE: ( YOU CAN CHOOSE ANY PERSON THAT CAN ASSIST YOU WITH YOUR HEALTH CARE PREFERENCES, DOES NOT HAVE TO BE A SPOUSE, IMMEDIATE FAMILY OR SIGNIFICANT OTHER/PARTNER)
99.4, light headed, dizzy, wet cough. Dr prescribed Zpak and had chest xray, with CBC , BMP. Does not have a smart phone   
yes

## 2020-05-13 NOTE — PATIENT PROFILE ADULT - NSPROMEDSPATCH_GEN_A_NUR
Quality 226: Preventive Care And Screening: Tobacco Use: Screening And Cessation Intervention: Patient screened for tobacco use and is an ex/non-smoker
Quality 111:Pneumonia Vaccination Status For Older Adults: Pneumococcal Vaccination Previously Received
Detail Level: Detailed
none
Quality 110: Preventive Care And Screening: Influenza Immunization: Influenza Immunization Administered during Influenza season

## 2022-07-11 NOTE — PROGRESS NOTE ADULT - SUBJECTIVE AND OBJECTIVE BOX
Patient is a 63y old  Male who presents with a chief complaint of respiratory failure (22 Dec 2018 19:46)      INTERVAL HPI/ OVERNIGHT EVENTS: Pt was seen and examined at bedside today, No significant overnight events, pt denies SOB or wheezing.       MEDICATIONS  (STANDING):  ALBUTerol    90 MICROgram(s) HFA Inhaler 1 Puff(s) Inhalation every 4 hours  ampicillin/sulbactam  IVPB 3 Gram(s) IV Intermittent every 6 hours  aspirin enteric coated 81 milliGRAM(s) Oral daily  atorvastatin 20 milliGRAM(s) Oral at bedtime  carvedilol 12.5 milliGRAM(s) Oral every 12 hours  chlorhexidine 4% Liquid 1 Application(s) Topical <User Schedule>  dextrose 5%. 1000 milliLiter(s) (50 mL/Hr) IV Continuous <Continuous>  dextrose 50% Injectable 12.5 Gram(s) IV Push once  dextrose 50% Injectable 25 Gram(s) IV Push once  dextrose 50% Injectable 25 Gram(s) IV Push once  enoxaparin Injectable 80 milliGRAM(s) SubCutaneous every 12 hours  folic acid 1 milliGRAM(s) Oral daily  furosemide    Tablet 20 milliGRAM(s) Oral daily  influenza   Vaccine 0.5 milliLiter(s) IntraMuscular once  insulin glargine Injectable (LANTUS) 15 Unit(s) SubCutaneous at bedtime  insulin lispro (HumaLOG) corrective regimen sliding scale   SubCutaneous Before meals and at bedtime  insulin lispro Injectable (HumaLOG) 4 Unit(s) SubCutaneous before breakfast  insulin lispro Injectable (HumaLOG) 4 Unit(s) SubCutaneous before lunch  insulin lispro Injectable (HumaLOG) 4 Unit(s) SubCutaneous before dinner  lisinopril 20 milliGRAM(s) Oral daily  potassium phosphate IVPB 15 milliMole(s) IV Intermittent once  predniSONE   Tablet 40 milliGRAM(s) Oral daily  thiamine 100 milliGRAM(s) Oral daily  tiotropium 18 MICROgram(s) Capsule 1 Capsule(s) Inhalation daily    MEDICATIONS  (PRN):  dextrose 40% Gel 15 Gram(s) Oral once PRN Blood Glucose LESS THAN 70 milliGRAM(s)/deciliter  glucagon  Injectable 1 milliGRAM(s) IntraMuscular once PRN Glucose LESS THAN 70 milligrams/deciliter      Allergies    No Known Allergies    Intolerances        REVIEW OF SYSTEMS:    Unable to examine due to [ ] Encephalopathy [ ] Advanced Dementia [ ] Expressive Aphasia [ ] Non-verbal patient    CONSTITUTIONAL: No fever, NO generalized weakness/Fatigue, No weight loss  EYES: No eye pain, visual disturbances, or discharge  ENMT:  No difficulty hearing, tinnitus, vertigo; No sinus or throat pain  NECK: No pain or stiffness  RESPIRATORY: No shortness of breath,  cough, wheezing, sputum or hemoptysis   CARDIOVASCULAR: No chest pain, palpitations, or leg swelling  GASTROINTESTINAL: No abdominal pain. No nausea, vomiting, diarrhea or constipation. No melena or hematochezia.  GENITOURINARY: No dysuria, frequency, hematuria, or incontinence  NEUROLOGICAL: No headaches, Dizziness, memory loss, loss of strength, numbness, or tremors  SKIN: No itching, burning, rashes, or lesions   MUSCULOSKELETAL: No joint pain or swelling; No muscle, back, or extremity pain  PSYCHIATRIC: No depression, anxiety, mood swings, or difficulty sleeping  HEME/LYMPH: No easy bruising, or bleeding gums      Vital Signs Last 24 Hrs  T(C): 36.9 (23 Dec 2018 05:34), Max: 37.1 (23 Dec 2018 00:00)  T(F): 98.4 (23 Dec 2018 05:34), Max: 98.7 (23 Dec 2018 00:00)  HR: 56 (23 Dec 2018 05:34) (56 - 76)  BP: 169/73 (23 Dec 2018 05:34) (120/66 - 184/86)  BP(mean): 91 (22 Dec 2018 18:09) (91 - 111)  RR: 18 (23 Dec 2018 05:34) (14 - 21)  SpO2: 98% (23 Dec 2018 05:34) (96% - 100%)    PHYSICAL EXAM:  GENERAL: NAD, well-developed, well-groomed  HEAD:  Atraumatic, Normocephalic  EYES: conjunctiva and sclera clear  ENMT: Moist mucous membranes  NECK: Supple, No JVD, Normal thyroid  CHEST/LUNG: Clear to Auscultation bilaterally; No rales, rhonchi, wheezing, or rubs  HEART: Regular rate and rhythm; No murmurs, rubs, or gallops  ABDOMEN: Soft, Nontender, Nondistended; Bowel sounds present  EXTREMITIES:  2+ Peripheral Pulses, No clubbing, cyanosis, or edema  SKIN: No rashes or lesions  NERVOUS SYSTEM:  Alert & Oriented X3, Good concentration; Motor Strength 5/5 B/L upper and lower extremities    LABS:                        10.3   9.49  )-----------( 135      ( 23 Dec 2018 07:55 )             32.9     12-23    144  |  111<H>  |  18  ----------------------------<  108<H>  3.6   |  27  |  0.80    Ca    8.0<L>      23 Dec 2018 07:55  Phos  1.6     12-23  Mg     2.3     12-23      PT/INR - ( 22 Dec 2018 04:03 )   PT: 14.6 sec;   INR: 1.29 ratio             CAPILLARY BLOOD GLUCOSE      POCT Blood Glucose.: 102 mg/dL (23 Dec 2018 07:41)  POCT Blood Glucose.: 264 mg/dL (22 Dec 2018 21:45)  POCT Blood Glucose.: 264 mg/dL (22 Dec 2018 16:01)        Culture - Blood (collected 12-21-18)  Source: .Blood Blood  Preliminary Report (12-22-18):    No growth to date.    Culture - Blood (collected 12-20-18)  Source: .Blood Blood-Peripheral  Gram Stain (12-22-18):    Growth in aerobic bottle: Gram Positive Cocci in Clusters  Final Report (12-22-18):    Growth in aerobic bottle: Staphylococcus capitis "Susceptibilities not    performed"    "Due to technical problems, Proteus sp. will Not be reported as part of    the BCID panel until further notice"    ***Blood Panel PCR results on this specimen are available    approximately 3 hours after the Gram stain result.***    Gram stain, PCR, and/or culture results may not always    correspond due to difference in methodologies.    ************************************************************    This PCR assay was performed using [employer].    The following targets are tested for: Enterococcus,    vancomycin resistant enterococci, Listeria monocytogenes,    coagulase negative staphylococci, S. aureus,    methicillin resistant S. aureus, Streptococcus agalactiae    (Group B), S. pneumoniae, S. pyogenes (Group A),    Acinetobacter baumannii, Enterobacter cloacae, E. coli,    Klebsiella oxytoca, K. pneumoniae, Proteus sp.,    Serratia marcescens, Haemophilus influenzae,    Neisseria meningitidis, Pseudomonas aeruginosa, Candida    albicans, C. glabrata, C krusei, C parapsilosis,    C. tropicalis and the KPC resistance gene.  Organism: Blood Culture PCR (12-22-18)  Organism: Blood Culture PCR (12-22-18)    Sensitivities:      -  Coagulase negative Staphylococcus: Detec      Method Type: PCR    Culture - Blood (collected 12-20-18)  Source: .Blood Blood-Peripheral  Gram Stain (prelim) (12-21-18):    Growth in aerobic bottle: Gram Positive Cocci in Pairs and Chains and    Gram Negative Rods    Growth in anaerobic bottle: Gram positive cocci in pairs  Preliminary Report (12-22-18):    Growth in aerobic bottle: Enterococcus faecium and Pantoea agglomerans    Growth in anaerobic bottle: Coag Negative Staphylococcus    Single set isolate, possible contaminant. Contact    Microbiology if susceptibility testing clinically    indicated.    "Dueto technical problems, Proteus sp. will Not be reported as part of    the BCID panel until further notice"    ***Blood Panel PCR results on this specimen are available    approximately 3 hours after the Gram stain result.***    Gram stain, PCR, and/or culture results may not always    correspond due to difference in methodologies.    ************************************************************    This PCR assay was performed using [employer].    The following targets are tested for: Enterococcus,    vancomycin resistant enterococci, Listeria monocytogenes,    coagulase negative staphylococci, S. aureus,    methicillin resistant S. aureus, Streptococcus agalactiae    (Group B), S. pneumoniae, S. pyogenes (Group A),    Acinetobacter baumannii, Enterobacter cloacae, E. coli,    Klebsiella oxytoca, K. pneumoniae, Proteus sp.,    Serratia marcescens, Haemophilus influenzae,    Neisseria meningitidis, Pseudomonas aeruginosa, Candida    albicans, C. glabrata, C krusei, C parapsilosis,    C. tropicalis and the KPC resistancegene.  Organism: Blood Culture PCR  Blood Culture PCR  Pantoea agglomerans (Previousley Enterobacter) (12-23-18)  Organism: Pantoea agglomerans (Previousley Enterobacter) (12-23-18)    Sensitivities:      -  Amikacin: S <=8      -  Ampicillin: S 4 These ampicillin results predict results for amoxicillin      -  Ampicillin/Sulbactam: S <=4/2      -  Aztreonam: S <=4      -  Cefazolin: S <=2      -  Cefepime: S <=2      -  Cefoxitin: S 8      -  Ceftriaxone: S <=1 Enterobacter, Citrobacter, and Serratia may develop resistance during prolonged therapy      -  Ciprofloxacin: S <=0.5      -  Ertapenem: S <=0.5      -  Gentamicin: S <=1      -  Imipenem: S <=1      -  Levofloxacin: S <=1      -  Meropenem: S <=1      -  Piperacillin/Tazobactam: S <=8      -  Tobramycin: S <=2      -  Trimethoprim/Sulfamethoxazole: S <=0.5/9.5      Method Type: BRIANNE  Organism: Blood Culture PCR (12-21-18)    Sensitivities:      -  Coagulase negative Staphylococcus: Detec      Method Type: PCR  Organism: Blood Culture PCR (12-21-18)    Sensitivities:      -  Acinetobacter baumanii: Detec      -  Enterococcus species: Detec      Method Type: PCR    Culture - Urine (collected 12-20-18)  Source: .Urine Catheterized  Final Report (12-21-18):    No growth        RADIOLOGY & ADDITIONAL TESTS:          Imaging Personally Reviewed:  [ ] YES  [ ] NO    Consultant(s) Notes Reviewed:  [x ] YES  [ ] NO    Care Discussed with Consultants/Other Providers [x ] YES  [ ] NO no

## 2022-12-22 NOTE — PROGRESS NOTE ADULT - PROBLEM SELECTOR PLAN 3
Is This A New Presentation, Or A Follow-Up?: Rash
Additional History: Patient was prescribed ketoconazole cream because the hospital told her it looked like fungus it is not bothersome.
SEPSIS POA  cause Staph capitis, enterococcus, acinetobacer, source unclear, most likely a contamination.   Infectious disease note appreciated and reccomned continuation of antibx till 1/4/18 can change to oral at d/c   CT abd noted no s/o infection    incidental renal cysts noted .
Staph capitis, enterococcus, acinetobacer  Most likely a contamination.   Infectious disease note appreciated.   CT abd noted no s/o infection    incidental renal cysts noted .
Staph capitis, enterococcus, acinetobacer  Most likely a contamination.   Infectious disease note appreciated.   CT abd noted no s/o infection    incidental renal cysts noted .
Staph capitis, enterococcus, acineto   Most likely a contamination.   Infectious disease note appreciated.   f/u CT abd to r/o pathology
SEPSIS POA  cause Staph capitis, enterococcus, acinetobacer, source unclear, most likely a contamination.   Infectious disease note appreciated and recommend continuation of antibx till 1/4/18 can change to oral at d/c   CT abd noted no s/o infection    incidental renal cysts noted .

## 2023-05-08 NOTE — ED ADULT TRIAGE NOTE - CHIEF COMPLAINT QUOTE
Difficulty breathing since 1550, received 2 albuterol by ems. Non-selective debridement of wound using cotton tip applicator and puracyn wound  to remove excess drainage and slough from wound bed.
